# Patient Record
Sex: FEMALE | Race: ASIAN | Employment: OTHER | URBAN - METROPOLITAN AREA
[De-identification: names, ages, dates, MRNs, and addresses within clinical notes are randomized per-mention and may not be internally consistent; named-entity substitution may affect disease eponyms.]

---

## 2017-09-15 ENCOUNTER — HOSPITAL ENCOUNTER (OUTPATIENT)
Dept: OTHER | Age: 67
Discharge: HOME | End: 2017-09-15
Attending: FAMILY MEDICINE | Admitting: FAMILY MEDICINE

## 2017-09-15 DIAGNOSIS — R05.9 COUGH: ICD-10-CM

## 2019-07-31 PROBLEM — N18.4 CKD (CHRONIC KIDNEY DISEASE) STAGE 4, GFR 15-29 ML/MIN (HCC): Status: ACTIVE | Noted: 2019-07-31

## 2019-07-31 PROBLEM — E55.9 VITAMIN D DEFICIENCY: Status: ACTIVE | Noted: 2019-07-31

## 2019-10-15 PROBLEM — R80.0 ISOLATED PROTEINURIA: Status: ACTIVE | Noted: 2019-10-15

## 2020-06-14 NOTE — PROGRESS NOTES
Medical Oncology:  Radiation:  Other: Dr. Florin Hameed (nephrology)        [id number]:  IA right breast cancer      HPI:  Ms. Nati Huntley is a 68 y.o. woman who presents today with a new diagnosis of grade 2, right sided invasive lobular breast cancer. ER + MO- HER2 negative. She states that she does perform routine self breast evaluations and has not noticed any new abnormalities such as masses, skin changes, color changes, nipple discharge, or changes to the nipple-areolar complex. She has never had an abnormal mammogram.  It is been 2 to 3 years since her last mammogram.  Prior to this she has never had breast related problems and has never required a breast biopsy. She has no family history of breast or ovarian cancer. She is here today in initial consultation to discuss her recent breast diagnosis. She was referred by Dr. Simon Jo. INTERVAL HISTORY:    On 5/21/2020 she was recalled from screening mammogram for an architectural distortion of the right breast.  Left breast was negative. There is again demonstrated a subtle distortion in the upper outer quadrant of the right breast.  Ultrasound correlate of the 10 o'clock position in the right breast identifies an irregular hypoechoic nodule with spiculated margins measuring 6 mm in greatest diameter. BI-RADS 4. On 6/5/2020 she underwent core needle biopsy of the right breast.  Pathology identified grade 2 invasive lobular carcinoma. ER positive (>90%) MO negative HER-2 negative by IHC. Also identified was LCIS and atypical ductal hyperplasia. This will be reviewed by our in-house pathologist. [id number]          Review of Systems   Constitutional: Negative. HENT: Negative. Eyes: Negative. Respiratory: Negative. Cardiovascular: Negative. Gastrointestinal: Negative. Genitourinary: Negative. Musculoskeletal: Negative. Skin: Negative. Neurological: Negative. Psychiatric/Behavioral: Negative.     All other systems reviewed and are complete details, see the attached [employer] FISH Analysis HER2 Breast, Case #[id number], dated 2020. YL/lsr     GROSS DESCRIPTION Young Guerra   Right breast mass 10:00 position 14G core:   Received in formalin labeled Lilia Velasquez - needle biopsy right breast 14 gauge\" which consists of cylindrical orange-yellow and red-brown needle cores and needle core fragments that measure up to 1.7 cm in length and up to 2 mm in diameter. The specimen is totally embedded in cassette labeled A1. S/S: (1) TE.   DS/jla     Time specimen removed from patient:  2020 - 1:35 pm   Time specimen placed in formalin:  2020 - 1:35 pm   Cold ischemic time:  0 minutes. Formalin fixation time:  6-72 hours. DS/jla      Specimen Collected on   Tissue - Entire right breast (body structure) 2020 1:35 PM   Result Narrative   This result has an attachment that is not available. No past medical history on file.:    History of renal stones and renal disease requiring specialist care. No past surgical history on file.:        Allergies as of 2020    (No Known Allergies)   :        Social History     Tobacco Use    Smoking status: Never Smoker    Smokeless tobacco: Never Used   Substance Use Topics    Alcohol use: No    Drug use: No   :      Family History:  No additional family history of breast or ovarian cancer    Hormonal History:   a.0  m.0  Menarche at age 13. First live birth at age 19. did not breastfeed. Postmenopausal at age 37  Hysterectomy: no hysterectomy  No estrogen supplementation  OCP taken in the past but not currently    Medications:  Medication documentation has been reviewedin the electronic medical record and patient office intake form. Exam:  There were no vitals taken for this visit. Constitutional: She appears well-nourished. No apparent distress. Breast: The patient was examined in the upright and supine position.   She has a \"B-C\" cup breast. Breasts are symmetrically ptotic. Right: There were no new masses or changes in breast contour. There were no skin changes of the breast or nipple areolar complex. There was no nipple inversion or discharge. Left: There were no new masses or changes in breast contour. There were no skin changes of the breast or nipple areolar complex. There was no nipple inversion or discharge. There is no axillary lymphadenopathy palpated bilaterally. Head: Normocephalic and atraumatic. Eyes: EOM are normal. Pupilsare equal, round, and reactive to light. Neck: Neck supple. No tracheal deviation present. Cardiovascular: regular rate. Extremities appear well perfused. Pulmonary: respirations are non-labored and without audible distress  Lymphatics: no palpable axillary or cervical lymphadenopathy. Skin: No rash noted. No erythema. Neurologic: alert and oriented. Assessment/Plan:  [id number]:  IA right breast cancer  ER + GA- HER2 negative. I have reviewed the results of her most recent breast imaging and pathology with her. We discussed the utility of a breast MRI and extent of disease. The surgical rational and technical details of undergoing breast conservation therapy versus a mastectomy were reviewed. She understood that patients who undergo breast conservation therapy, systemic therapy, and radiotherapy have comparable local recurrences and overall survival to those patients undergoing a mastectomy. She understands that patients may experience an asymmetric change in breast contour with breast conservation that can be exacerbated with radiation therapy. We discussed the technique of localization. This will be done preoperatively by targeting the lesion, near which the clip was placed post biopsy. This is performed using either mammographic or ultrasound guidance. We discussed the aspects of breast conservation including the need for negative margins. We discussed the risk of up to a therapy is traditionally given to patients undergoing breast conservation therapy to reduce the risk of local recurrence. I have recommended she undergo a postoperative consultation with radiation oncology to discuss this treatment. Fertility does not apply. The role of genetic consultation was discussed. She declines at this time. Reconstructive options in patients undergoing breast conservation versus a mastectomy were reviewed. Anticipated clinic follow up and survivorship were discussed. Self breast evaluation was reviewed. We will plan a right partial mastectomy with sentinel lymph node biopsy in the upcoming few weeks. Preop MRI  Medical and renal clearance      All of the patient's questions were answered at this time however, she was encouraged to call the office with any further inquiries. Approximately 90 minutes of time were spent in this visit of which 50% or more of the time was related to coordination of care.

## 2020-06-25 ENCOUNTER — OFFICE VISIT (OUTPATIENT)
Dept: SURGERY | Age: 70
End: 2020-06-25
Payer: MEDICARE

## 2020-06-25 VITALS
SYSTOLIC BLOOD PRESSURE: 130 MMHG | DIASTOLIC BLOOD PRESSURE: 80 MMHG | BODY MASS INDEX: 30.09 KG/M2 | HEART RATE: 50 BPM | OXYGEN SATURATION: 97 % | WEIGHT: 149 LBS

## 2020-06-25 PROCEDURE — 99205 OFFICE O/P NEW HI 60 MIN: CPT | Performed by: SURGERY

## 2020-06-25 RX ORDER — SODIUM CHLORIDE 0.9 % (FLUSH) 0.9 %
10 SYRINGE (ML) INJECTION PRN
Status: CANCELLED | OUTPATIENT
Start: 2020-06-25

## 2020-06-25 RX ORDER — SODIUM CHLORIDE 0.9 % (FLUSH) 0.9 %
10 SYRINGE (ML) INJECTION EVERY 12 HOURS SCHEDULED
Status: CANCELLED | OUTPATIENT
Start: 2020-06-25

## 2020-06-25 RX ORDER — LIDOCAINE HYDROCHLORIDE 10 MG/ML
0.1 INJECTION, SOLUTION EPIDURAL; INFILTRATION; INTRACAUDAL; PERINEURAL
Status: SHIPPED | OUTPATIENT
Start: 2020-06-25 | End: 2020-06-25

## 2020-06-25 RX ORDER — SODIUM CHLORIDE, SODIUM LACTATE, POTASSIUM CHLORIDE, CALCIUM CHLORIDE 600; 310; 30; 20 MG/100ML; MG/100ML; MG/100ML; MG/100ML
INJECTION, SOLUTION INTRAVENOUS CONTINUOUS
Status: CANCELLED | OUTPATIENT
Start: 2020-06-25

## 2020-06-25 ASSESSMENT — ENCOUNTER SYMPTOMS
RESPIRATORY NEGATIVE: 1
GASTROINTESTINAL NEGATIVE: 1
EYES NEGATIVE: 1

## 2020-07-10 ENCOUNTER — HOSPITAL ENCOUNTER (OUTPATIENT)
Dept: MRI IMAGING | Age: 70
Discharge: HOME | End: 2020-07-10
Payer: MEDICARE

## 2020-07-10 ENCOUNTER — HOSPITAL ENCOUNTER (OUTPATIENT)
Age: 70
Discharge: HOME | End: 2020-07-10
Payer: MEDICARE

## 2020-07-10 LAB
ALBUMIN SERPL-MCNC: 4 G/DL (ref 3.4–5)
ANION GAP SERPL CALCULATED.3IONS-SCNC: 9 MMOL/L (ref 3–16)
BUN BLDV-MCNC: 36 MG/DL (ref 7–20)
CALCIUM SERPL-MCNC: 9.5 MG/DL (ref 8.3–10.6)
CHLORIDE BLD-SCNC: 106 MMOL/L (ref 99–110)
CO2: 27 MMOL/L (ref 21–32)
CREAT SERPL-MCNC: 2.3 MG/DL (ref 0.6–1.2)
GFR AFRICAN AMERICAN: 25
GFR NON-AFRICAN AMERICAN: 21
GLUCOSE BLD-MCNC: 107 MG/DL (ref 70–99)
HCT VFR BLD CALC: 36.4 % (ref 36–48)
HEMOGLOBIN: 11.9 G/DL (ref 12–16)
MCH RBC QN AUTO: 28.3 PG (ref 26–34)
MCHC RBC AUTO-ENTMCNC: 32.8 G/DL (ref 31–36)
MCV RBC AUTO: 86.2 FL (ref 80–100)
PDW BLD-RTO: 13.8 % (ref 12.4–15.4)
PHOSPHORUS: 3.8 MG/DL (ref 2.5–4.9)
PLATELET # BLD: 151 K/UL (ref 135–450)
PMV BLD AUTO: 8.3 FL (ref 5–10.5)
POTASSIUM SERPL-SCNC: 4.2 MMOL/L (ref 3.5–5.1)
RBC # BLD: 4.22 M/UL (ref 4–5.2)
SODIUM BLD-SCNC: 142 MMOL/L (ref 136–145)
WBC # BLD: 6.1 K/UL (ref 4–11)

## 2020-07-10 PROCEDURE — 80069 RENAL FUNCTION PANEL: CPT

## 2020-07-10 PROCEDURE — 85027 COMPLETE CBC AUTOMATED: CPT

## 2020-07-10 PROCEDURE — 36415 COLL VENOUS BLD VENIPUNCTURE: CPT

## 2020-07-10 NOTE — PROGRESS NOTES
MRI BREAST WITH  AND WITHOUT  CONTRAST NOT PERFORMED DUE TO PTS GFR ON 7/10/20 25 AND UNABLE TO GIVE CONTRAST UNLESS 30. PT HAS HX OF STAGE 4 KIDNEY DISEASE ALSO.  INFORMED DR SOTO'S OFFICE OF THIS ON 7/10/20 AT 1300 HR AND EXPLAINED THIS TO PATIENT ALSO

## 2020-07-31 ENCOUNTER — TELEPHONE (OUTPATIENT)
Dept: SURGERY | Age: 70
End: 2020-07-31

## 2020-07-31 NOTE — TELEPHONE ENCOUNTER
Message left on Ravinder [place] voice mail, to have Celeste Ang return call regarding her surgery. I need to make sure she has been cleared by her Nephrologist nad has had her  Pre-op. Third time I have called [phone].

## 2020-08-03 ENCOUNTER — TELEPHONE (OUTPATIENT)
Dept: SURGERY | Age: 70
End: 2020-08-03

## 2020-08-03 NOTE — TELEPHONE ENCOUNTER
Tracy Contreras from [facility] office called because the patient has surgery on 8/18/20. They just wanted to know if the patient needs any blood work done before surgery. Please call Tracy Contreras back at [phone]. Thank you.

## 2020-08-07 ENCOUNTER — TELEPHONE (OUTPATIENT)
Dept: SURGERY | Age: 70
End: 2020-08-07

## 2020-08-07 NOTE — TELEPHONE ENCOUNTER
VM left asking patient to please return call to office. Patient has to have clearance from her nephrologist before surgery can be done. Patient has been called multiple times, messages have been left on emergency contact's VM as well. No response.

## 2020-08-13 ENCOUNTER — OFFICE VISIT (OUTPATIENT)
Dept: PRIMARY CARE CLINIC | Age: 70
End: 2020-08-13
Payer: MEDICARE

## 2020-08-13 PROCEDURE — G8417 CALC BMI ABV UP PARAM F/U: HCPCS | Performed by: NURSE PRACTITIONER

## 2020-08-13 PROCEDURE — 99211 OFF/OP EST MAY X REQ PHY/QHP: CPT | Performed by: NURSE PRACTITIONER

## 2020-08-13 PROCEDURE — G8428 CUR MEDS NOT DOCUMENT: HCPCS | Performed by: NURSE PRACTITIONER

## 2020-08-13 RX ORDER — ALBUTEROL SULFATE 90 UG/1
2 AEROSOL, METERED RESPIRATORY (INHALATION) EVERY 6 HOURS PRN
COMMUNITY
End: 2022-01-06 | Stop reason: SDUPTHER

## 2020-08-13 RX ORDER — CIPROFLOXACIN 250 MG/1
250 TABLET, FILM COATED ORAL 2 TIMES DAILY
COMMUNITY
Start: 2020-08-12 | End: 2020-09-04 | Stop reason: ALTCHOICE

## 2020-08-13 NOTE — PROGRESS NOTES
Name_______________________________________Printed:____________________  Date and time of [id number]_______________________Pcblgzw Time:_0615 [facility]_______________   1. The instructions given regarding when and if a patient needs to stop oral intake prior to surgery varies. Follow the specific instructions you were given                  _X__Nothing to eat or to drink after Midnight the night before.                             ____Endoscopy patient follow your DRS instructions-generally you will be doing a part of the prep after Midnight                   ____Carbo loading or ERAS instructions will be given to select patients-if you have been given those instructions -please do the following                           The evening before your surgery after dinner before midnight drink 40 ounces of gatorade. If you are diabetic use sugar free. The morning of surgery drink 40 ounces of water. This needs to be finished 3 hours prior to your surgery start time. 2. Take the following pills with a small sip of water on the morning of surgery____NONE_______________________________________________                  Do not take blood pressure medications ending in pril or sartan the kena prior to surgery or the morning of surgery_   3. Aspirin, Ibuprofen, Advil, Naproxen, Vitamin E and other Anti-inflammatory products and supplements should be stopped for  -7days before surgery or as directed by your physician. 4. Check with your Doctor regarding stopping Plavix, Coumadin,Eliquis, Lovenox,Effient,Pradaxa,Xarelto, Fragmin or other blood thinners and follow their instructions. 5. Do not smoke, and do not drink any alcoholic beverages 24 hours prior to surgery. This includes NA Beer. Refrain from the usage of any recreational drugs. 6. You may brush your teeth and gargle the morning of surgery. DO NOT SWALLOW WATER   7.  You MUST make arrangements for a responsible adult to stay on site while you are here and take you home after your surgery. You will not be allowed to leave alone or drive yourself home. It is strongly suggested someone stay with you the first 24 hrs. Your surgery will be cancelled if you do not have a ride home. 8. A parent/legal guardian must accompany a child scheduled for surgery and plan to stay at the hospital until the child is discharged. Please do not bring other children with you. 9. Please wear simple, loose fitting clothing to the hospital.  [facility] not bring valuables (money, credit cards, checkbooks, etc.) Do not wear any makeup (including no eye makeup) or nail polish on your fingers or toes. 10. DO NOT wear any jewelry or piercings on day of surgery. All body piercing jewelry must be removed. 11. If you have ___dentures, they will be removed before going to the OR; we will provide you a container. If you wear ___contact lenses or ___glasses, they will be removed; please bring a case for them. 12. Please see your family doctor/pediatrician for a history & physical and/or concerning medications. Bring any test results/reports from your physician's office. PCP__________________Phone___________H&P Appt. Date________             13 If you  have a Living Will and Durable Power of  for Healthcare, please bring in a copy. 15. Notify your Surgeon if you develop any illness between now and surgery  time, cough, cold, fever, sore throat, nausea, vomiting, etc.  Please notify your surgeon if you experience dizziness, shortness of breath or blurred vision between now & the time of your surgery             15. DO NOT shave your operative site 96 hours prior to surgery. For face & neck surgery, men may use an electric razor 48 hours prior to surgery. 16. Shower the night before or morning of surgery using an antibacterial soap or as you have been instructed.              17. To provide excellent care visitors will be limited to one in the room at any given time. 18.  Please bring picture ID and insurance card. 19.  Visit our web site for additional information:  [email or web address]              20.During flu season no children under the age of 15 are permitted in the hospital for the safety of all patients. 21. If you take a long acting insulin in the evening only  take half of your usual  dose the night  before your procedure              22. If you use a c-pap please bring DOS if staying overnight,             23.For your convenience [id number] [facility] has a pharmacy on site to fill your prescriptions. 24. If you use oxygen and have a portable tank please bring it  with you the DOS             25. Bring a complete list of all your medications with name and dose include any supplements. 26. Other_Patient instructed to get their COVID-19 test done as directed by their doctor (5-7 days prior to procedure)  or patient states will get on __________. Patient was notified that they need to have an appointment,number to call provided. The day the COVID test is done is considered day one. Instructed to self quarantine after test until DOS. There is a one visitor policy at [facility] for all surgeries and endoscopies. Whether the visitor can stay or will be asked to wait in the car will depend on the current policy and if social distancing can be maintained. The policy is subject to change at any time. Please make sure the visitor has a cell phone that is on,charged and able to accept calls, as this may be the way that the staff communicates with them. Pain management is NO VISITOR policyThe patients ride is expected to remain in the car with a cell phone for communication. If the ride is leaving the hospital grounds please make sure they are back in time for pickup. Have the patient inform the staff on arrival what their rides plans are while the patient is in the facility. At the [facility] there is one visitor allowed. Please note that the visitor policy is subject to change._________________________________________   *Please call pre admission testing if you any further questions   Maggie Velezet [address] [place] [address]  [phone]   [address] [place] [address]       All above information reviewed with patient in person or by phone. Patient verbalizes understanding. All questions and concerns addressed.                                                                                                  Patient/Rep__PT__________________                                                                                                                                    PRE OP INSTRUCTIONS

## 2020-08-13 NOTE — PATIENT INSTRUCTIONS

## 2020-08-14 LAB — SARS-COV-2, NAA: NOT DETECTED

## 2020-08-18 ENCOUNTER — ANESTHESIA EVENT (OUTPATIENT)
Dept: OPERATING ROOM | Age: 70
End: 2020-08-18
Payer: MEDICARE

## 2020-08-18 ENCOUNTER — APPOINTMENT (OUTPATIENT)
Dept: WOMENS IMAGING | Age: 70
End: 2020-08-18
Attending: SURGERY
Payer: MEDICARE

## 2020-08-18 ENCOUNTER — HOSPITAL ENCOUNTER (OUTPATIENT)
Dept: NUCLEAR MEDICINE | Age: 70
Discharge: HOME | End: 2020-08-18
Payer: MEDICARE

## 2020-08-18 ENCOUNTER — ANESTHESIA (OUTPATIENT)
Dept: OPERATING ROOM | Age: 70
End: 2020-08-18
Payer: MEDICARE

## 2020-08-18 ENCOUNTER — HOSPITAL ENCOUNTER (OUTPATIENT)
Age: 70
Setting detail: OUTPATIENT SURGERY
Discharge: HOME | End: 2020-08-18
Attending: SURGERY | Admitting: SURGERY
Payer: MEDICARE

## 2020-08-18 ENCOUNTER — HOSPITAL ENCOUNTER (OUTPATIENT)
Dept: WOMENS IMAGING | Age: 70
Setting detail: OUTPATIENT SURGERY
Discharge: HOME | End: 2020-08-18
Attending: SURGERY
Payer: MEDICARE

## 2020-08-18 ENCOUNTER — HOSPITAL ENCOUNTER (OUTPATIENT)
Dept: WOMENS IMAGING | Age: 70
Setting detail: OUTPATIENT SURGERY
End: 2020-08-18
Attending: SURGERY
Payer: MEDICARE

## 2020-08-18 VITALS
TEMPERATURE: 97.9 F | WEIGHT: 148 LBS | RESPIRATION RATE: 16 BRPM | BODY MASS INDEX: 29.84 KG/M2 | SYSTOLIC BLOOD PRESSURE: 136 MMHG | HEART RATE: 43 BPM | OXYGEN SATURATION: 98 % | DIASTOLIC BLOOD PRESSURE: 54 MMHG | HEIGHT: 59 IN

## 2020-08-18 VITALS
OXYGEN SATURATION: 100 % | SYSTOLIC BLOOD PRESSURE: 111 MMHG | DIASTOLIC BLOOD PRESSURE: 55 MMHG | RESPIRATION RATE: 12 BRPM

## 2020-08-18 LAB
ABO/RH: NORMAL
ANION GAP SERPL CALCULATED.3IONS-SCNC: 11 MMOL/L (ref 3–16)
ANTIBODY SCREEN: NORMAL
BUN BLDV-MCNC: 40 MG/DL (ref 7–20)
CALCIUM SERPL-MCNC: 9.3 MG/DL (ref 8.3–10.6)
CHLORIDE BLD-SCNC: 105 MMOL/L (ref 99–110)
CO2: 26 MMOL/L (ref 21–32)
CREAT SERPL-MCNC: 2.7 MG/DL (ref 0.6–1.2)
GFR AFRICAN AMERICAN: 21
GFR NON-AFRICAN AMERICAN: 17
GLUCOSE BLD-MCNC: 100 MG/DL (ref 70–99)
POTASSIUM SERPL-SCNC: 3.9 MMOL/L (ref 3.5–5.1)
SODIUM BLD-SCNC: 142 MMOL/L (ref 136–145)

## 2020-08-18 PROCEDURE — 38525 BIOPSY/REMOVAL LYMPH NODES: CPT | Performed by: SURGERY

## 2020-08-18 PROCEDURE — 14301 TIS TRNFR ANY 30.1-60 SQ CM: CPT | Performed by: SURGERY

## 2020-08-18 PROCEDURE — 6370000000 HC RX 637 (ALT 250 FOR IP): Performed by: SURGERY

## 2020-08-18 PROCEDURE — 2500000003 HC RX 250 WO HCPCS: Performed by: SURGERY

## 2020-08-18 PROCEDURE — 88305 TISSUE EXAM BY PATHOLOGIST: CPT

## 2020-08-18 PROCEDURE — 86900 BLOOD TYPING SEROLOGIC ABO: CPT

## 2020-08-18 PROCEDURE — 7100000011 HC PHASE II RECOVERY - ADDTL 15 MIN: Performed by: SURGERY

## 2020-08-18 PROCEDURE — 3600000014 HC SURGERY LEVEL 4 ADDTL 15MIN: Performed by: SURGERY

## 2020-08-18 PROCEDURE — 7100000001 HC PACU RECOVERY - ADDTL 15 MIN: Performed by: SURGERY

## 2020-08-18 PROCEDURE — 86850 RBC ANTIBODY SCREEN: CPT

## 2020-08-18 PROCEDURE — 2500000003 HC RX 250 WO HCPCS: Performed by: NURSE ANESTHETIST, CERTIFIED REGISTERED

## 2020-08-18 PROCEDURE — 2580000003 HC RX 258: Performed by: SURGERY

## 2020-08-18 PROCEDURE — 3430000000 HC RX DIAGNOSTIC RADIOPHARMACEUTICAL: Performed by: SURGERY

## 2020-08-18 PROCEDURE — 76098 X-RAY EXAM SURGICAL SPECIMEN: CPT

## 2020-08-18 PROCEDURE — 19294 PREPJ TUM CAV IORT PRTL MAST: CPT | Performed by: SURGERY

## 2020-08-18 PROCEDURE — 86901 BLOOD TYPING SEROLOGIC RH(D): CPT

## 2020-08-18 PROCEDURE — 88342 IMHCHEM/IMCYTCHM 1ST ANTB: CPT

## 2020-08-18 PROCEDURE — 38792 RA TRACER ID OF SENTINL NODE: CPT | Performed by: SURGERY

## 2020-08-18 PROCEDURE — 88307 TISSUE EXAM BY PATHOLOGIST: CPT

## 2020-08-18 PROCEDURE — 7100000000 HC PACU RECOVERY - FIRST 15 MIN: Performed by: SURGERY

## 2020-08-18 PROCEDURE — 2709999900 HC NON-CHARGEABLE SUPPLY: Performed by: SURGERY

## 2020-08-18 PROCEDURE — 80048 BASIC METABOLIC PNL TOTAL CA: CPT

## 2020-08-18 PROCEDURE — 88360 TUMOR IMMUNOHISTOCHEM/MANUAL: CPT

## 2020-08-18 PROCEDURE — A4648 IMPLANTABLE TISSUE MARKER: HCPCS

## 2020-08-18 PROCEDURE — 3600000004 HC SURGERY LEVEL 4 BASE: Performed by: SURGERY

## 2020-08-18 PROCEDURE — 6360000002 HC RX W HCPCS: Performed by: SURGERY

## 2020-08-18 PROCEDURE — A4648 IMPLANTABLE TISSUE MARKER: HCPCS | Performed by: SURGERY

## 2020-08-18 PROCEDURE — A9520 TC99 TILMANOCEPT DIAG 0.5MCI: HCPCS | Performed by: SURGERY

## 2020-08-18 PROCEDURE — 6360000002 HC RX W HCPCS: Performed by: NURSE ANESTHETIST, CERTIFIED REGISTERED

## 2020-08-18 PROCEDURE — 2720000010 HC SURG SUPPLY STERILE: Performed by: SURGERY

## 2020-08-18 PROCEDURE — 19301 PARTIAL MASTECTOMY: CPT | Performed by: SURGERY

## 2020-08-18 PROCEDURE — 38900 IO MAP OF SENT LYMPH NODE: CPT | Performed by: SURGERY

## 2020-08-18 PROCEDURE — 3700000000 HC ANESTHESIA ATTENDED CARE: Performed by: SURGERY

## 2020-08-18 PROCEDURE — 7100000010 HC PHASE II RECOVERY - FIRST 15 MIN: Performed by: SURGERY

## 2020-08-18 PROCEDURE — 38792 RA TRACER ID OF SENTINL NODE: CPT

## 2020-08-18 PROCEDURE — 3700000001 HC ADD 15 MINUTES (ANESTHESIA): Performed by: SURGERY

## 2020-08-18 DEVICE — Z INACTIVE USE 2535481 MARKER SURG W2XH1XL2CM BIOZORB LO PROF: Type: IMPLANTABLE DEVICE | Site: BREAST | Status: FUNCTIONAL

## 2020-08-18 RX ORDER — PHENYLEPHRINE HCL IN 0.9% NACL 1 MG/10 ML
SYRINGE (ML) INTRAVENOUS PRN
Status: DISCONTINUED | OUTPATIENT
Start: 2020-08-18 | End: 2020-08-18 | Stop reason: SDUPTHER

## 2020-08-18 RX ORDER — CLINDAMYCIN PHOSPHATE 900 MG/50ML
900 INJECTION INTRAVENOUS
Status: DISCONTINUED | OUTPATIENT
Start: 2020-08-18 | End: 2020-08-18 | Stop reason: SDUPTHER

## 2020-08-18 RX ORDER — SODIUM CHLORIDE 0.9 % (FLUSH) 0.9 %
10 SYRINGE (ML) INJECTION EVERY 12 HOURS SCHEDULED
Status: DISCONTINUED | OUTPATIENT
Start: 2020-08-18 | End: 2020-08-18 | Stop reason: HOSPADM

## 2020-08-18 RX ORDER — HYDROCODONE BITARTRATE AND ACETAMINOPHEN 5; 325 MG/1; MG/1
1 TABLET ORAL
Status: DISCONTINUED | OUTPATIENT
Start: 2020-08-18 | End: 2020-08-18 | Stop reason: HOSPADM

## 2020-08-18 RX ORDER — ISOSULFAN BLUE 50 MG/5ML
INJECTION, SOLUTION SUBCUTANEOUS
Status: COMPLETED | OUTPATIENT
Start: 2020-08-18 | End: 2020-08-18

## 2020-08-18 RX ORDER — SUCCINYLCHOLINE/SOD CL,ISO/PF 200MG/10ML
SYRINGE (ML) INTRAVENOUS PRN
Status: DISCONTINUED | OUTPATIENT
Start: 2020-08-18 | End: 2020-08-18 | Stop reason: SDUPTHER

## 2020-08-18 RX ORDER — PROPOFOL 10 MG/ML
INJECTION, EMULSION INTRAVENOUS PRN
Status: DISCONTINUED | OUTPATIENT
Start: 2020-08-18 | End: 2020-08-18 | Stop reason: SDUPTHER

## 2020-08-18 RX ORDER — HYDROMORPHONE HCL 110MG/55ML
0.5 PATIENT CONTROLLED ANALGESIA SYRINGE INTRAVENOUS EVERY 5 MIN PRN
Status: DISCONTINUED | OUTPATIENT
Start: 2020-08-18 | End: 2020-08-18 | Stop reason: HOSPADM

## 2020-08-18 RX ORDER — BUPIVACAINE HYDROCHLORIDE AND EPINEPHRINE 2.5; 5 MG/ML; UG/ML
INJECTION, SOLUTION EPIDURAL; INFILTRATION; INTRACAUDAL; PERINEURAL
Status: COMPLETED | OUTPATIENT
Start: 2020-08-18 | End: 2020-08-18

## 2020-08-18 RX ORDER — SODIUM CHLORIDE, SODIUM LACTATE, POTASSIUM CHLORIDE, CALCIUM CHLORIDE 600; 310; 30; 20 MG/100ML; MG/100ML; MG/100ML; MG/100ML
INJECTION, SOLUTION INTRAVENOUS CONTINUOUS
Status: DISCONTINUED | OUTPATIENT
Start: 2020-08-18 | End: 2020-08-18 | Stop reason: HOSPADM

## 2020-08-18 RX ORDER — GLYCOPYRROLATE 1 MG/5 ML
SYRINGE (ML) INTRAVENOUS PRN
Status: DISCONTINUED | OUTPATIENT
Start: 2020-08-18 | End: 2020-08-18 | Stop reason: SDUPTHER

## 2020-08-18 RX ORDER — LIDOCAINE HYDROCHLORIDE 20 MG/ML
INJECTION, SOLUTION EPIDURAL; INFILTRATION; INTRACAUDAL; PERINEURAL PRN
Status: DISCONTINUED | OUTPATIENT
Start: 2020-08-18 | End: 2020-08-18 | Stop reason: SDUPTHER

## 2020-08-18 RX ORDER — ONDANSETRON 2 MG/ML
INJECTION INTRAMUSCULAR; INTRAVENOUS PRN
Status: DISCONTINUED | OUTPATIENT
Start: 2020-08-18 | End: 2020-08-18 | Stop reason: SDUPTHER

## 2020-08-18 RX ORDER — HYDROCODONE BITARTRATE AND ACETAMINOPHEN 5; 325 MG/1; MG/1
1 TABLET ORAL EVERY 4 HOURS PRN
Qty: 42 TABLET | Refills: 0 | Status: SHIPPED | OUTPATIENT
Start: 2020-08-18 | End: 2020-08-25

## 2020-08-18 RX ORDER — HYDROMORPHONE HCL 110MG/55ML
0.25 PATIENT CONTROLLED ANALGESIA SYRINGE INTRAVENOUS EVERY 5 MIN PRN
Status: DISCONTINUED | OUTPATIENT
Start: 2020-08-18 | End: 2020-08-18 | Stop reason: HOSPADM

## 2020-08-18 RX ORDER — FENTANYL CITRATE 50 UG/ML
INJECTION, SOLUTION INTRAMUSCULAR; INTRAVENOUS PRN
Status: DISCONTINUED | OUTPATIENT
Start: 2020-08-18 | End: 2020-08-18 | Stop reason: SDUPTHER

## 2020-08-18 RX ORDER — CEPHALEXIN 500 MG/1
500 CAPSULE ORAL 4 TIMES DAILY
Qty: 28 CAPSULE | Refills: 0 | Status: SHIPPED | OUTPATIENT
Start: 2020-08-18 | End: 2020-08-25

## 2020-08-18 RX ORDER — LIDOCAINE HYDROCHLORIDE 10 MG/ML
1 INJECTION, SOLUTION EPIDURAL; INFILTRATION; INTRACAUDAL; PERINEURAL
Status: DISCONTINUED | OUTPATIENT
Start: 2020-08-18 | End: 2020-08-18 | Stop reason: HOSPADM

## 2020-08-18 RX ORDER — DEXAMETHASONE SODIUM PHOSPHATE 4 MG/ML
INJECTION, SOLUTION INTRA-ARTICULAR; INTRALESIONAL; INTRAMUSCULAR; INTRAVENOUS; SOFT TISSUE PRN
Status: DISCONTINUED | OUTPATIENT
Start: 2020-08-18 | End: 2020-08-18 | Stop reason: SDUPTHER

## 2020-08-18 RX ORDER — SODIUM CHLORIDE 0.9 % (FLUSH) 0.9 %
10 SYRINGE (ML) INJECTION PRN
Status: DISCONTINUED | OUTPATIENT
Start: 2020-08-18 | End: 2020-08-18 | Stop reason: HOSPADM

## 2020-08-18 RX ORDER — SODIUM CHLORIDE 9 MG/ML
INJECTION, SOLUTION INTRAVENOUS CONTINUOUS
Status: DISCONTINUED | OUTPATIENT
Start: 2020-08-18 | End: 2020-08-18 | Stop reason: HOSPADM

## 2020-08-18 RX ORDER — ONDANSETRON 2 MG/ML
4 INJECTION INTRAMUSCULAR; INTRAVENOUS
Status: DISCONTINUED | OUTPATIENT
Start: 2020-08-18 | End: 2020-08-18 | Stop reason: HOSPADM

## 2020-08-18 RX ADMIN — Medication 200 MCG: at 09:56

## 2020-08-18 RX ADMIN — ONDANSETRON 4 MG: 2 INJECTION INTRAMUSCULAR; INTRAVENOUS at 09:48

## 2020-08-18 RX ADMIN — CEFAZOLIN 2 G: 10 INJECTION, POWDER, FOR SOLUTION INTRAVENOUS at 09:28

## 2020-08-18 RX ADMIN — FENTANYL CITRATE 50 MCG: 50 INJECTION, SOLUTION INTRAMUSCULAR; INTRAVENOUS at 10:33

## 2020-08-18 RX ADMIN — TILMANOCEPT 0.5 MILLICURIE: KIT at 09:57

## 2020-08-18 RX ADMIN — Medication 0.3 MG: at 09:55

## 2020-08-18 RX ADMIN — PROPOFOL 120 MG: 10 INJECTION, EMULSION INTRAVENOUS at 09:40

## 2020-08-18 RX ADMIN — SODIUM CHLORIDE, POTASSIUM CHLORIDE, SODIUM LACTATE AND CALCIUM CHLORIDE: 600; 310; 30; 20 INJECTION, SOLUTION INTRAVENOUS at 08:42

## 2020-08-18 RX ADMIN — DEXAMETHASONE SODIUM PHOSPHATE 8 MG: 4 INJECTION, SOLUTION INTRAMUSCULAR; INTRAVENOUS at 09:48

## 2020-08-18 RX ADMIN — Medication 140 MG: at 09:40

## 2020-08-18 RX ADMIN — FENTANYL CITRATE 50 MCG: 50 INJECTION, SOLUTION INTRAMUSCULAR; INTRAVENOUS at 09:38

## 2020-08-18 RX ADMIN — LIDOCAINE HYDROCHLORIDE 60 MG: 20 INJECTION, SOLUTION EPIDURAL; INFILTRATION; INTRACAUDAL; PERINEURAL at 09:40

## 2020-08-18 ASSESSMENT — PULMONARY FUNCTION TESTS
PIF_VALUE: 21
PIF_VALUE: 20
PIF_VALUE: 21
PIF_VALUE: 21
PIF_VALUE: 1
PIF_VALUE: 21
PIF_VALUE: 22
PIF_VALUE: 18
PIF_VALUE: 21
PIF_VALUE: 1
PIF_VALUE: 22
PIF_VALUE: 26
PIF_VALUE: 22
PIF_VALUE: 19
PIF_VALUE: 0
PIF_VALUE: 21
PIF_VALUE: 22
PIF_VALUE: 21
PIF_VALUE: 22
PIF_VALUE: 15
PIF_VALUE: 21
PIF_VALUE: 21
PIF_VALUE: 24
PIF_VALUE: 21
PIF_VALUE: 21
PIF_VALUE: 8
PIF_VALUE: 21
PIF_VALUE: 21
PIF_VALUE: 22
PIF_VALUE: 20
PIF_VALUE: 2
PIF_VALUE: 21
PIF_VALUE: 21
PIF_VALUE: 24
PIF_VALUE: 21
PIF_VALUE: 22
PIF_VALUE: 21
PIF_VALUE: 21
PIF_VALUE: 22
PIF_VALUE: 20
PIF_VALUE: 18
PIF_VALUE: 18
PIF_VALUE: 22
PIF_VALUE: 21
PIF_VALUE: 21
PIF_VALUE: 22
PIF_VALUE: 21
PIF_VALUE: 24
PIF_VALUE: 21
PIF_VALUE: 21
PIF_VALUE: 22
PIF_VALUE: 21
PIF_VALUE: 21
PIF_VALUE: 17
PIF_VALUE: 20
PIF_VALUE: 24
PIF_VALUE: 18
PIF_VALUE: 20
PIF_VALUE: 21
PIF_VALUE: 0
PIF_VALUE: 21
PIF_VALUE: 21
PIF_VALUE: 0
PIF_VALUE: 0
PIF_VALUE: 20
PIF_VALUE: 21
PIF_VALUE: 20
PIF_VALUE: 21
PIF_VALUE: 30
PIF_VALUE: 23
PIF_VALUE: 21
PIF_VALUE: 21
PIF_VALUE: 22
PIF_VALUE: 21
PIF_VALUE: 21
PIF_VALUE: 20
PIF_VALUE: 35
PIF_VALUE: 21
PIF_VALUE: 18
PIF_VALUE: 21
PIF_VALUE: 21
PIF_VALUE: 20
PIF_VALUE: 24
PIF_VALUE: 21
PIF_VALUE: 22
PIF_VALUE: 2
PIF_VALUE: 22
PIF_VALUE: 21
PIF_VALUE: 18
PIF_VALUE: 0
PIF_VALUE: 16
PIF_VALUE: 21
PIF_VALUE: 22
PIF_VALUE: 21
PIF_VALUE: 19

## 2020-08-18 ASSESSMENT — PAIN - FUNCTIONAL ASSESSMENT: PAIN_FUNCTIONAL_ASSESSMENT: 0-10

## 2020-08-18 NOTE — OP NOTE
Operative Note    Postoperative Note    Fauzia Fagan  YOB: 1950  [id number]    Pre-operative Diagnosis: T 1b N0 right breast cancer    Post-operative Diagnosis: Same    Procedure: Injection of isosulfuran blue dye for lymphatic mapping, injection of radiopharmaceutical for lymphatic mapping, lymphatic mapping with neoprobe unit, right deep axillary selective lymph node dissection, right needle directed partial mastectomy, right tissue rearrangement procedure for area of 30 sq. Cm. Immediate insertion of a breast prosthesis: BioZorb    Anesthesia: General    Surgeons/Assistants: Moira Browne Specking: Lauro Browning    Estimated Blood Loss: less than 50     Drains: none    Complications: None apparent at conclusion of procedure    Specimens: right breast tissue, sentinel nodes (see below for details)    Findings: sentinel nodes, specimen radiograph shows capture of lesion in question    Post-Op Condition: Stable    Disposition: to recovery room    Description of Procedure:   Ms. Lauren Kurtz is a 68 y.o. woman with a clinical T1b N0 right breast cancer. She has elected to proceed with right breast conservation therapy and selective lymph node dissection. The indications for the planned procedure, along with the potential benefits and risks which include but are not limited to the risk of anesthesia, bleeding, infection, possible failed operation, possible need for additional surgery pending final pathologic assessment, lymphedema, sensation changes, and unappealing cosmetics were reviewed. All questions were answered and she agrees to proceed. Ms. Lauren Kurtz was met by me in the preoperative area. The surgical site was identified. Consent was obtained. The appropriate breast imaging was reviewed. She underwent an image guided localization procedure preoperatively which was performed by the on site radiologist. The right breast lesion was again noted with the biopsy clip.  The localizer is in good position. She was brought to the operating room and placed supine with her arms extended on boards. She was appropriately positioned and padded. Compression stockings were placed. Appropriate antibiotics were administered within 60 minutes of the incision. Breast imaging was available in the room. After induction of general anesthesia, the appropriate World Health Organization timeout procedure was performed. At 1123 0.5 millicuries of Lymphoseek technetium-99 sulfur colloid was injected intradermally in a periareolar location at 12:00, 3:00, 6:00, and 9:00. A neoprobe was then used to identify a hot spot. The location was marked. This initial count was 565. After this 5 mL of  isosulfuran blue dye for lymphatic mapping was injected intraparenchymally at 0945. A five minute massage was performed. The right breast and axillary region, upper arm, and chest wall were prepped and draped in the normal sterile fashion. Attention was then turned to the breast for the partial mastectomy. There was one localizer placed by the mammographer marking the right breast cancer. The skin and soft tissues over the proposed incision were infiltrated with local. A periareolar incision was made at the 9-12:00 location. Flaps were raised and dissection was carried out in a rectangular fashion around the localizer and target. No skin was removed. Dissection was carried to the chest wall. This included a deep margin with a stitch on the new margin. Dissection was carried out carefully to try and maintain the localizer centrally within the specimen. The specimen was excised and gross examination revealed adequate margins. The specimen was oriented with a margin map. It was submitted for specimen radiography which revealed the lesion in question with adequate radiographic margins. The wound was irrigated and hemostasis was obtained.  In order to obtain the best cosmesis while closing the surgical cavity, I performed a radioactive and palpably suspicious nodes were removed. The axilla was then assessed for hemostasis. Cautery, surgical clips, and hemostatic agents were utilized as necessary. The wound was irrigated. The subcutaneous tissues were approximated with interrupted 2-0 and 3-0 vicryl sutures and the overlying skin with a 4-0 subcuticular stitch with overlying skin glue. The instrument, sponge, and needle counts were correct. Ms. Lauren Kurtz was awakened and placed into a surgical bra. She was taken to the recovery room in stable condition. Her family was notified of intraoperative findings.       Electronically signed by Sudha Macias MD on 8/18/20 at 8:07 AM EDT

## 2020-08-18 NOTE — H&P
H&P Update    The patient's most recent H&P, office notes, breast imaging, and pathology were reviewed. Patient examined and laterality marked. There has been no changes. We will plan to proceed with a right partial mastectomy with sentinel lymph node biopsy. The patient was counseled at length about the risks of mallory Covid-19 during their perioperative period and any recovery window from their procedure. The patient was made aware that mallory Covid-19  may worsen their prognosis for recovering from their procedure  and lend to a higher morbidity and/or mortality risk. All material risks, benefits, and reasonable alternatives including postponing the procedure were discussed. The patient does wish to proceed with the procedure at this time.           Benjamin Barajas

## 2020-08-18 NOTE — ANESTHESIA POSTPROCEDURE EVALUATION
Department of Anesthesiology  Postprocedure Note    Patient: Julieta Mckeon  MRN: [id number]  YOB: 1950  Date of evaluation: 8/18/2020  Time:  12:30 PM     Procedure Summary     Date:  08/18/20 Room / Location:  [address]    Anesthesia Start:  0930 Anesthesia Stop:  4746    Procedure:  RIGHT BREAST LOCALIZIED PARTIAL MASTECTOMY; SENTINEL LYMPH NODE BIOPSY, BIOZORB, TECHNETIUM NINETY-NINE AND BLUE DYE IN O.R. (Right Breast) Diagnosis:  (C50.911  PRIMARY BREAST MALIGNANCY, RIGHT ([address] - PRIMARY)    Surgeon:  Brant Small MD Responsible Provider:  Malou Camp MD    Anesthesia Type:  general ASA Status:  3          Anesthesia Type: general    Benito Phase I: Benito Score: 10    Benito Phase II: Benito Score: 10    Last vitals: Reviewed and per EMR flowsheets.        Anesthesia Post Evaluation    Patient location during evaluation: PACU  Patient participation: complete - patient participated  Level of consciousness: awake  Airway patency: patent  Nausea & Vomiting: no vomiting  Complications: no  Cardiovascular status: hemodynamically stable  Respiratory status: acceptable  Hydration status: euvolemic

## 2020-08-18 NOTE — ANESTHESIA PRE PROCEDURE
Department of Anesthesiology  Preprocedure Note       Name:  Radha Marsh   Age:  68 y.o.  :  1950                                          MRN:  [id number]         Date:  2020      Surgeon: Aleksandr Raines):  Jose Armando Sanchez MD    Procedure: Procedure(s):  RIGHT BREAST LOCALIZIED PARTIAL MASTECTOMY; SENTINEL LYMPH NODE BIOPSY, BIOZORB, TECHNETIUM NINETY-NINE AND BLUE DYE IN O.R.    Medications prior to admission:   Prior to Admission medications    Medication Sig Start Date End Date Taking? Authorizing Provider   HYDROcodone-acetaminophen (NORCO) 5-325 MG per tablet Take 1 tablet by mouth every 4 hours as needed for Pain for up to 7 days. Intended supply: 7 days.  Take lowest dose possible to manage pain 20 Yes Jose Armando Sanchez MD   albuterol sulfate HFA (VENTOLIN HFA) 108 (90 Base) MCG/ACT inhaler Inhale 2 puffs into the lungs every 6 hours as needed for Wheezing   Yes Historical Provider, MD   ciprofloxacin (CIPRO) 250 MG tablet Take 250 mg by mouth 2 times daily 20  Yes Historical Provider, MD   simvastatin (ZOCOR) 20 MG tablet TAKE 1 TABLET BY MOUTH AT BEDTIME 19  Yes Historical Provider, MD   losartan (COZAAR) 25 MG tablet Take 12.5 mg by mouth daily  19  Yes Historical Provider, MD   montelukast (SINGULAIR) 10 MG tablet Take 10 mg by mouth nightly   Yes Historical Provider, MD   budesonide-formoterol (SYMBICORT) 80-4.5 MCG/ACT AERO Inhale 2 puffs into the lungs daily as needed NOT TAKING 19  Historical Provider, MD       Current medications:    Current Facility-Administered Medications   Medication Dose Route Frequency Provider Last Rate Last Dose    ceFAZolin (ANCEF) 2 g in dextrose 5 % 100 mL IVPB  2 g Intravenous On Call to [facility], MD        lactated ringers infusion   Intravenous Continuous Jose Armando Sanchez MD 50 mL/hr at 20 0842      sodium chloride flush 0.9 % injection 10 mL  10 mL Intravenous 2 times per day Reny Chapa MD        sodium chloride flush 0.9 % injection 10 mL  10 mL Intravenous PRN Reny Chapa MD        HYDROcodone-acetaminophen [facility]) 5-325 MG per tablet 1 tablet  1 tablet Oral Once PRN Marty Leach MD        ondansetron [facility]) injection 4 mg  4 mg Intravenous Once PRN Marty Leach MD        HYDROmorphone (DILAUDID) injection 0.25 mg  0.25 mg Intravenous Q5 Min PRN Marty Leach MD        HYDROmorphone (DILAUDID) injection 0.5 mg  0.5 mg Intravenous Q5 Min PRN Marty Leach MD           Allergies:  No Known Allergies    Problem List:    Patient Active Problem List   Diagnosis Code    Vitamin D deficiency E55.9    CKD (chronic kidney disease) stage 4, GFR 15-29 ml/min (HCC) N18.4    Isolated proteinuria R80.0       Past Medical History:        Diagnosis Date    Asthma     CKD (chronic kidney disease) stage 4, GFR 15-29 ml/min (HCC)     Hyperlipidemia     Hypertension     Kidney stones     Memory loss     Osteopenia        Past Surgical History:        Procedure Laterality Date    COLONOSCOPY      HYSTERECTOMY      LITHOTRIPSY         Social History:    Social History     Tobacco Use    Smoking status: Never Smoker    Smokeless tobacco: Never Used   Substance Use Topics    Alcohol use:  No                                Counseling given: Not Answered      Vital Signs (Current):   Vitals:    08/13/20 1038 08/18/20 0717 08/18/20 0726   BP:   136/67   Pulse:   (!) 48   Resp:   12   Temp:   98.3 °F (36.8 °C)   TempSrc:   Temporal   SpO2:   99%   Weight: 158 lb (71.7 kg) 148 lb (67.1 kg)    Height: 4' 11\" (1.499 m) 4' 11\" (1.499 m)                                               BP Readings from Last 3 Encounters:   08/18/20 136/67   08/10/20 128/70   06/25/20 130/80       NPO Status: Time of last liquid consumption: 2300                        Time of last solid consumption: 2300                        Date of last liquid consumption: 08/17/20 Date of last solid food consumption: 08/17/20    BMI:   Wt Readings from Last 3 Encounters:   08/18/20 148 lb (67.1 kg)   08/10/20 158 lb (71.7 kg)   06/25/20 149 lb (67.6 kg)     Body mass index is 29.89 kg/m². CBC:   Lab Results   Component Value Date    WBC 6.1 07/10/2020    RBC 4.22 07/10/2020    HGB 11.9 07/10/2020    HCT 36.4 07/10/2020    MCV 86.2 07/10/2020    RDW 13.8 07/10/2020     07/10/2020       CMP:   Lab Results   Component Value Date     08/18/2020    K 3.9 08/18/2020     08/18/2020    CO2 26 08/18/2020    BUN 40 08/18/2020    CREATININE 2.7 08/18/2020    GFRAA 21 08/18/2020    LABGLOM 17 08/18/2020    GLUCOSE 100 08/18/2020    GLUCOSE 97 08/30/2019    CALCIUM 9.3 08/18/2020       POC Tests: No results for input(s): POCGLU, POCNA, POCK, POCCL, POCBUN, POCHEMO, POCHCT in the last 72 hours.     Coags: No results found for: PROTIME, INR, APTT    HCG (If Applicable): No results found for: PREGTESTUR, PREGSERUM, HCG, HCGQUANT     ABGs: No results found for: PHART, PO2ART, NMW6LIM, DAK8FMJ, BEART, G6XNPIEK     Type & Screen (If Applicable):  No results found for: LABABO, LABRH    Drug/Infectious Status (If Applicable):  No results found for: HIV, HEPCAB    COVID-19 Screening (If Applicable):   Lab Results   Component Value Date    COVID19 NOT DETECTED 08/13/2020         Anesthesia Evaluation  Patient summary reviewed no history of anesthetic complications:   Airway: Mallampati: II  TM distance: >3 FB   Neck ROM: full  Mouth opening: > = 3 FB Dental:      Comment: Missing some teeth, front, top none loose    Pulmonary: breath sounds clear to auscultation  (+) asthma (has not used albuterol since March):     (-) rhonchi and wheezes                           Cardiovascular:    (+) hypertension:, hyperlipidemia    (-) CABG/stent, dysrhythmias and  angina      Rhythm: regular  Rate: normal                    Neuro/Psych:      (-) seizures, TIA and CVA            ROS comment: Memory issues GI/Hepatic/Renal:   (+) renal disease: CRI,      (-) GERD       Endo/Other:                      ROS comment: Breast mass Abdominal:           Vascular:                                        Anesthesia Plan      general     ASA 3       Induction: intravenous. MIPS: Postoperative opioids intended, Prophylactic antiemetics administered and Postoperative trial extubation. Anesthetic plan and risks discussed with patient and spouse. Use of blood products discussed with patient whom consented to blood products. Plan discussed with CRNA.                 Lydia Sepulveda MD   8/18/2020

## 2020-08-26 ENCOUNTER — APPOINTMENT (OUTPATIENT)
Dept: CT IMAGING | Age: 70
End: 2020-08-26
Payer: MEDICARE

## 2020-08-26 ENCOUNTER — HOSPITAL ENCOUNTER (EMERGENCY)
Age: 70
Discharge: HOME | End: 2020-08-27
Attending: EMERGENCY MEDICINE
Payer: MEDICARE

## 2020-08-26 LAB
A/G RATIO: 1.2 (ref 1.1–2.2)
ALBUMIN SERPL-MCNC: 4.1 G/DL (ref 3.4–5)
ALP BLD-CCNC: 62 U/L (ref 40–129)
ALT SERPL-CCNC: 10 U/L (ref 10–40)
ANION GAP SERPL CALCULATED.3IONS-SCNC: 10 MMOL/L (ref 3–16)
AST SERPL-CCNC: 19 U/L (ref 15–37)
BASOPHILS ABSOLUTE: 0 K/UL (ref 0–0.2)
BASOPHILS RELATIVE PERCENT: 0.4 %
BILIRUB SERPL-MCNC: 0.5 MG/DL (ref 0–1)
BUN BLDV-MCNC: 31 MG/DL (ref 7–20)
CALCIUM SERPL-MCNC: 9.6 MG/DL (ref 8.3–10.6)
CHLORIDE BLD-SCNC: 97 MMOL/L (ref 99–110)
CO2: 26 MMOL/L (ref 21–32)
CREAT SERPL-MCNC: 2.1 MG/DL (ref 0.6–1.2)
EOSINOPHILS ABSOLUTE: 0.1 K/UL (ref 0–0.6)
EOSINOPHILS RELATIVE PERCENT: 1.3 %
GFR AFRICAN AMERICAN: 28
GFR NON-AFRICAN AMERICAN: 23
GLOBULIN: 3.3 G/DL
GLUCOSE BLD-MCNC: 116 MG/DL (ref 70–99)
HCT VFR BLD CALC: 38.7 % (ref 36–48)
HEMOGLOBIN: 12.8 G/DL (ref 12–16)
LACTIC ACID, SEPSIS: 1.1 MMOL/L (ref 0.4–1.9)
LIPASE: 29 U/L (ref 13–60)
LYMPHOCYTES ABSOLUTE: 0.9 K/UL (ref 1–5.1)
LYMPHOCYTES RELATIVE PERCENT: 11.1 %
MCH RBC QN AUTO: 28.3 PG (ref 26–34)
MCHC RBC AUTO-ENTMCNC: 33.2 G/DL (ref 31–36)
MCV RBC AUTO: 85.3 FL (ref 80–100)
MONOCYTES ABSOLUTE: 0.6 K/UL (ref 0–1.3)
MONOCYTES RELATIVE PERCENT: 7.4 %
NEUTROPHILS ABSOLUTE: 6.3 K/UL (ref 1.7–7.7)
NEUTROPHILS RELATIVE PERCENT: 79.8 %
PDW BLD-RTO: 13.3 % (ref 12.4–15.4)
PLATELET # BLD: 153 K/UL (ref 135–450)
PMV BLD AUTO: 8.8 FL (ref 5–10.5)
POTASSIUM SERPL-SCNC: 4 MMOL/L (ref 3.5–5.1)
RBC # BLD: 4.53 M/UL (ref 4–5.2)
SODIUM BLD-SCNC: 133 MMOL/L (ref 136–145)
TOTAL PROTEIN: 7.4 G/DL (ref 6.4–8.2)
WBC # BLD: 7.9 K/UL (ref 4–11)

## 2020-08-26 PROCEDURE — 74176 CT ABD & PELVIS W/O CONTRAST: CPT

## 2020-08-26 PROCEDURE — 96374 THER/PROPH/DIAG INJ IV PUSH: CPT

## 2020-08-26 PROCEDURE — 6360000002 HC RX W HCPCS: Performed by: NURSE PRACTITIONER

## 2020-08-26 PROCEDURE — 85025 COMPLETE CBC W/AUTO DIFF WBC: CPT

## 2020-08-26 PROCEDURE — 6370000000 HC RX 637 (ALT 250 FOR IP): Performed by: NURSE PRACTITIONER

## 2020-08-26 PROCEDURE — 80053 COMPREHEN METABOLIC PANEL: CPT

## 2020-08-26 PROCEDURE — 83690 ASSAY OF LIPASE: CPT

## 2020-08-26 PROCEDURE — 83605 ASSAY OF LACTIC ACID: CPT

## 2020-08-26 PROCEDURE — 99283 EMERGENCY DEPT VISIT LOW MDM: CPT

## 2020-08-26 RX ORDER — MORPHINE SULFATE 4 MG/ML
4 INJECTION, SOLUTION INTRAMUSCULAR; INTRAVENOUS ONCE
Status: COMPLETED | OUTPATIENT
Start: 2020-08-26 | End: 2020-08-26

## 2020-08-26 RX ADMIN — MAGNESIUM HYDROXIDE 30 ML: 400 SUSPENSION ORAL at 22:12

## 2020-08-26 RX ADMIN — MORPHINE SULFATE 4 MG: 4 INJECTION INTRAVENOUS at 22:11

## 2020-08-26 RX ADMIN — NALOXEGOL OXALATE 25 MG: 25 TABLET, FILM COATED ORAL at 22:54

## 2020-08-26 ASSESSMENT — PAIN SCALES - GENERAL
PAINLEVEL_OUTOF10: 9
PAINLEVEL_OUTOF10: 8

## 2020-08-27 VITALS
RESPIRATION RATE: 18 BRPM | TEMPERATURE: 98.2 F | HEART RATE: 62 BPM | OXYGEN SATURATION: 96 % | SYSTOLIC BLOOD PRESSURE: 116 MMHG | DIASTOLIC BLOOD PRESSURE: 68 MMHG

## 2020-08-27 RX ADMIN — Medication 240 ML: at 00:08

## 2020-08-27 ASSESSMENT — ENCOUNTER SYMPTOMS
SHORTNESS OF BREATH: 0
RECTAL PAIN: 1
CONSTIPATION: 1
ABDOMINAL PAIN: 1
NAUSEA: 0
DIARRHEA: 0
VOMITING: 0
CHEST TIGHTNESS: 0

## 2020-08-27 NOTE — ED NOTES
Per pt and , good results from enema. Pt 'feels more relaxed'. Shayy Salinas NP aware.       James Gant RN  08/27/20 2357

## 2020-08-27 NOTE — ED PROVIDER NOTES
[facility]        Pt Name: Evgeny Crews  MRN: [id number]  [place] 1950  Date of evaluation: 8/26/2020  Provider: CECI Shepherd - MORRO  PCP: Catalino Valle MD     I have seen and evaluated this patient with my supervising physician Junita Dancer, MD.    [facility]       Chief Complaint   Patient presents with    Constipation     pt with recent surgery for breast CA on August 18th. pt states hasn't had bm in 8 days and has not been able to urinate since early this morning. HISTORY OF PRESENT ILLNESS   (Location, Timing/Onset, Context/Setting, Quality, Duration, Modifying Factors, Severity, Associated Signs and Symptoms)  Note limiting factors. Evgeny Crews is a 68 y.o. female presents to the emergency department complaining of constipation with some anal leakage of liquid stool, cramping and pain in the rectum. States that she is been unable to have a bowel movement in 8 days. She did have a mastectomy with lymph ectomy on right side on August 18. She is been taking pain medication. States that she called her PCP today and they told her to switch to tylenol due to likely opioid induced constipation. Reports that she is unable to urinate due to constipation. Denies any headache, fever, lightheadedness, dizziness, visual disturbances. No neck or back pain. No shortness of breath, cough, or congestion. No nausea, vomiting, diarrhea, or dysuria. No rash. Nursing Notes were all reviewed and agreed with or any disagreements were addressed in the HPI. REVIEW OF SYSTEMS    (2-9 systems for level 4, 10 or more for level 5)     Review of Systems   Constitutional: Negative for activity change, chills and fever. Respiratory: Negative for chest tightness and shortness of breath. Cardiovascular: Negative for chest pain. Gastrointestinal: Positive for abdominal pain, constipation and rectal pain. Negative for diarrhea, nausea and vomiting. Genitourinary: Negative for dysuria. All other systems reviewed and are negative. Positives and Pertinent negatives as per HPI. Except as noted above in the ROS, all other systems were reviewed and negative. PAST MEDICAL HISTORY     Past Medical History:   Diagnosis Date    Asthma     CKD (chronic kidney disease) stage 4, GFR 15-29 ml/min ([facility])     Hyperlipidemia     Hypertension     Kidney stones     Memory loss     Osteopenia          SURGICAL HISTORY     Past Surgical History:   Procedure Laterality Date    BREAST LUMPECTOMY Right 8/18/2020    RIGHT BREAST LOCALIZIED PARTIAL MASTECTOMY; SENTINEL LYMPH NODE BIOPSY, BIOZORB, TECHNETIUM NINETY-NINE AND BLUE DYE IN O.R. performed by Allie Arias MD at [address]       Discharge Medication List as of 8/27/2020  1:32 AM      CONTINUE these medications which have NOT CHANGED    Details   albuterol sulfate HFA (VENTOLIN HFA) 108 (90 Base) MCG/ACT inhaler Inhale 2 puffs into the lungs every 6 hours as needed for WheezingHistorical Med      ciprofloxacin (CIPRO) 250 MG tablet Take 250 mg by mouth 2 times dailyHistorical Med      simvastatin (ZOCOR) 20 MG tablet TAKE 1 TABLET BY MOUTH AT BEDTIME, R-3Historical Med      losartan (COZAAR) 25 MG tablet Take 12.5 mg by mouth daily Historical Med      montelukast (SINGULAIR) 10 MG tablet Take 10 mg by mouth nightlyHistorical Med      budesonide-formoterol (SYMBICORT) 80-4.5 MCG/ACT AERO Inhale 2 puffs into the lungs daily as needed NOT TAKINGHistorical Med               ALLERGIES     Patient has no known allergies. FAMILYHISTORY     History reviewed. No pertinent family history. SOCIAL HISTORY       Social History     Tobacco Use    Smoking status: Never Smoker    Smokeless tobacco: Never Used   Substance Use Topics    Alcohol use: No    Drug use:  No SCREENINGS             PHYSICAL EXAM    (up to 7 for level 4, 8 or more for level 5)     ED Triage Vitals [08/26/20 2042]   BP Temp Temp Source Pulse Resp SpO2 Height Weight   137/76 98.2 °F (36.8 °C) Temporal 57 17 97 % -- --       Physical Exam  Vitals signs and nursing note reviewed. Constitutional:       Appearance: She is well-developed. She is not diaphoretic. HENT:      Head: Normocephalic and atraumatic. Right Ear: External ear normal.      Left Ear: External ear normal.   Eyes:      General:         Right eye: No discharge. Left eye: No discharge. Neck:      Musculoskeletal: Normal range of motion and neck supple. Vascular: No JVD. Cardiovascular:      Rate and Rhythm: Normal rate and regular rhythm. Pulses: Normal pulses. Heart sounds: Normal heart sounds. Pulmonary:      Effort: Pulmonary effort is normal. No respiratory distress. Abdominal:      Palpations: Abdomen is soft. Musculoskeletal: Normal range of motion. Skin:     General: Skin is warm and dry. Coloration: Skin is not pale. Neurological:      Mental Status: She is alert and oriented to person, place, and time.    Psychiatric:         Behavior: Behavior normal.         DIAGNOSTIC RESULTS   LABS:    Labs Reviewed   CBC WITH AUTO DIFFERENTIAL - Abnormal; Notable for the following components:       Result Value    Lymphocytes Absolute 0.9 (*)     All other components within normal limits    Narrative:     Performed at:  [facility] [address], [place], [address]   Phone [phone]   COMPREHENSIVE METABOLIC PANEL - Abnormal; Notable for the following components:    Sodium 133 (*)     Chloride 97 (*)     Glucose 116 (*)     BUN 31 (*)     CREATININE 2.1 (*)     GFR Non- 23 (*)     GFR  28 (*)     All other components within normal limits    Narrative:     Performed at:  [facility]  [address] [facility],  [place], [place]   Phone [phone]   LACTATE, SEPSIS    Narrative:     Performed at:  [facility]  [address] [facility],  [place], [address]   Phone [phone]   LIPASE    Narrative:     Performed at:  [facility]  [address] [facility],  [place], [address]   Phone [phone]       All other labs were within normal range or not returned as of this dictation. EKG: All EKG's are interpreted by the Emergency Department Physician in the absence of a cardiologist.  Please see their note for interpretation of EKG. RADIOLOGY:   Non-plain film images such as CT, Ultrasound and MRI are read by the radiologist. Plain radiographic images are visualized and preliminarily interpreted by the ED Provider with the below findings:        Interpretation per the Radiologist below, if available at the time of this note:    CT ABDOMEN PELVIS WO CONTRAST Additional Contrast? None   Final Result   Moderate-to-large amount of stool throughout the colon, particularly within a   distended rectosigmoid colon which shows wall thickening and adjacent fat   stranding. Correlation for stercoral colitis is recommended. Both kidneys show atrophy and multiple cysts, more pronounced on the right. Ct Abdomen Pelvis Wo Contrast Additional Contrast? None    Result Date: 8/26/2020  EXAMINATION: CT OF THE ABDOMEN AND PELVIS WITHOUT CONTRAST 8/26/2020 9:30 pm TECHNIQUE: CT of the abdomen and pelvis was performed without the administration of intravenous contrast. Multiplanar reformatted images are provided for review. Dose modulation, iterative reconstruction, and/or weight based adjustment of the mA/kV was utilized to reduce the radiation dose to as low as reasonably achievable.  COMPARISON: None available HISTORY: ORDERING SYSTEM PROVIDED HISTORY: severe constipation TECHNOLOGIST PROVIDED HISTORY: Reason for exam:->severe constipation Additional Contrast?->None Reason for Exam: severe constipation Acuity: Acute Type of Exam: Initial Relevant Medical/Surgical History: Constipation (pt with recent surgery for breast CA on August 18th. pt states hasn't had bm in 8 days and has not been able to urinate since early this morning. ) History of recent surgery for breast cancer August 18th. No bowel movement in 8 days. History of kidney stones, hypertension, hyperlipidemia, hysterectomy, lithotripsy, right breast lumpectomy 08/18/2020 FINDINGS: Lower Chest: Calcified posterior right pleural plaque is noted. Organs: Noncontrast images of the liver, gallbladder, spleen, pancreas, adrenal glands, and kidneys show no acute abnormality. Both kidneys are atrophic and contain multiple cysts; atrophy is more pronounced on the right. GI/Bowel: Small hiatal hernia is identified. The stomach, small bowel, independent show no abnormality. There is a moderate-to-large amount of stool throughout the colon including within a distended rectosigmoid colon which shows wall thickening and mild adjacent fat stranding. Pelvis: The uterus and urinary bladder show no acute noncontrast abnormality. Peritoneum/Retroperitoneum: The aorta is normal in caliber and course, showing mild calcifications. Bones/soft tissues: No acute bony abnormality. L4-5 degenerative anterolisthesis is present. No acute bony abnormality. Small umbilical hernia containing only fat. No free or focal fluid collection. No extraluminal gas or pathologically enlarged abdominopelvic lymph nodes. Moderate-to-large amount of stool throughout the colon, particularly within a distended rectosigmoid colon which shows wall thickening and adjacent fat stranding. Correlation for stercoral colitis is recommended. Both kidneys show atrophy and multiple cysts, more pronounced on the right.            PROCEDURES   Unless otherwise noted below, none     Procedures    CRITICAL CARE TIME N/A    CONSULTS:  None      EMERGENCY DEPARTMENT COURSE and DIFFERENTIAL DIAGNOSIS/MDM:   Vitals:    Vitals:    08/27/20 0015 08/27/20 0030 08/27/20 0045 08/27/20 0100   BP: 114/71 116/65 (!) 114/58 116/68   Pulse:    62   Resp:    18   Temp:       TempSrc:       SpO2:  96% 97% 96%       Patient was given the following medications:  Medications   magnesium hydroxide (MILK OF MAGNESIA) 400 MG/5ML suspension 30 mL (30 mLs Oral Given 8/26/20 2212)   morphine injection 4 mg (4 mg Intravenous Given 8/26/20 2211)   naloxegol (MOVANTIK) tablet 25 mg (25 mg Oral Given 8/26/20 2254)   milk and molasses enema 240 mL (240 mLs Rectal Given 8/27/20 0008)           Briefly, this is a 68year old female that presents to the emergency department complaining of constipation with some anal leakage of liquid stool, cramping and pain in the rectum. States that she is been unable to have a bowel movement in 8 days. She did have a mastectomy with lymph ectomy on right side on August 18. She is been taking pain medication. States that she called her PCP today and they told her to switch to tylenol due to likely opioid induced constipation. Reports that she is unable to urinate due to constipation. CT ABDOMEN PELVIS WO CONTRAST Additional Contrast? None (Final result)   Result time 08/26/20 22:15:48   Final result by Adan Posada MD (08/26/20 22:15:48)                 Impression:     Moderate-to-large amount of stool throughout the colon, particularly within a   distended rectosigmoid colon which shows wall thickening and adjacent fat   stranding.  Correlation for stercoral colitis is recommended. Both kidneys show atrophy and multiple cysts, more pronounced on the right. Lactate 1.1. Lipase 29.0. CMP shows CKD with creatinine of 2.1. CBC is unremarkable. Patient did have large stool output, feels much better. She was given Movantik tonight as well as a milk of molasses enema.   She will be started on

## 2020-08-27 NOTE — ED NOTES
Enema given to pt with harder stool removed. Pt tolerating procedure and sitting on bedside commode at present.  at bedside.       Sudheer Ivey RN  08/27/20 [id number]

## 2020-08-29 NOTE — ED PROVIDER NOTES
Please see the [facility] note, patient presented for evaluation of fecal impaction, constipation secondary to narcotic analgesia postsurgical.  No evidence of sepsis bowel obstruction, she was given promotility agents disimpacted, she has no evidence of bowel obstruction or free air. Impression: Constipation, medication side effect.      Talib Vyas MD  23/34/47 5317

## 2020-08-31 NOTE — PROGRESS NOTES
PCP:  Medical Oncology: All Failing  Radiation:  Other:      pT1bN0  STAGE:  IA right breast cancer      Ms. Zacarias Dodson is a 68y.o.-year-old woman who is now s/p right partial mastectomy with sentinel lymph node biospy for right breast cancer. She underwent this procedure on 2020 and tolerated it well. She is doing quite well postoperatively and her pain is continuing to improve. INTERVAL HISTORY:  On 2020 she underwent a right partial mastectomy with sentinel lymph node biopsy. Pathology identified 9 mm of grade 2 invasive lobular carcinoma. LCIS is also identified. ER positive [place] negative HER-2 negative. Margins are negative. There is 0/2 lymph nodes involved with carcinoma. [id number]       Pathology:    Department of Pathology   FINAL SURGICAL PATHOLOGY REPORT   Patient Name: Zabrina Burns           Accession No:  [id number]    Age Sex:   1950    69 Y / F       Location:      [facility]   Account No:   [de-identified]                  Collected:     2020   Med Rec No:    [id number]                 Received:      2020   Attend Phys: Millie SOTO             Completed:     2020   Perform Phys: Millie SOTO             FINAL DIAGNOSIS:        A. Right breast tissue, excision:        - Invasive lobular carcinoma, Margie grade 2 (see SYNOPTIC          REPORT).      - Resection margins negative for invasive lobular carcinoma (closest          margin: 5mm from inferior).      - Lobular carcinoma in situ.          Note: LCIS is associated with invasive lobular carcinoma as well          as background breast tissue away from the invasive cancer.        B. Right breast tissue new deep margin, excision:        - Negative for carcinoma.        C. Right axillary sentinel lymph node #1, excision:        - No metastasis in one lymph node by H&E and PAN KERATIN stains          (0/1).    D.  Right axillary sentinel lymph node #2, excision:        - No metastasis in one lymph node by H&E and PAN KERATIN stains          (0/1). COMMENT:     SYNOPTIC REPORT FOR INVASIVE CARCINOMA OF THE BREAST     Specimen Identification     Procedure   Excision (less than total mastectomy)     Specimen Laterality   Right     Tumor Size   Greatest dimension of largest invasive focus: 9 mm     Histologic Type   Invasive lobular carcinoma   Note: E-cadherin stain shows loss of membranous staining, supporting   lobular differentiation. Histologic Grade ([place] Histologic Score)   Glandular (Acinar)/Tubular Differentiation   Score 3 (<10% of tumor area forming glandular/tubular structures)   Nuclear Pleomorphism   Score 2 (cells larger than normal with open vesicular nuclei, visible      nucleoli, and moderate variability in both size and shape)   Mitotic Rate   Score 1 (?3 mitoses per mm2)   Overall Grade   Grade 2 (scores of 6 or 7)     Ductal Carcinoma In Situ (DCIS)   Not identified     Lobular Carcinoma In Situ (LCIS)   Present     Margins   Invasive Carcinoma Margins   Uninvolved by invasive carcinoma    Distance from closest margin: 5mm    Specify closest margin: Inferior     Regional Lymph Nodes   Uninvolved by tumor cells        Number of Lymph Nodes Examined: 2        Number of [place] Nodes Examined: 2     Treatment Effect   No known presurgical therapy     Lymphovascular Invasion   Not identified     Pathologic Stage Classification (pTNM, AJCC 8th Edition)   Primary Tumor (Invasive Carcinoma) (pT)   pT1b: Tumor >5 mm but ? 10 mm in greatest dimension   Modifier    (sn): [place] node(s) evaluated. Category (pN)   pN0: No regional lymph node metastasis identified     Biomarkers (Performed on block A8):      Estrogen receptor: Positive (>90%, strong intensity)      Progesterone receptor: Negative (<1%)      HER2 IHC: Negative (Score 1+)     Performed by immunohistochemistry with manual quantitation.    Interpretation reference range:   ER and NH: Nuclear staining of 1% or more of tumor cells showing weak or   greater intensity is interpreted as positive. Her2/henry scoring follows the ASCO/CAP guidelines:   Score 0: no staining or membrane staining that is incomplete and is   faint/barely perceptible in < =10% of the tumor cells (reported   negative). Score 1+: incomplete membrane staining that is faint//barely perceptible   in >10% of the tumor cells (reported negative). Score 2+: weak to moderate complete membrane staining observed in >10% of   the tumor cells (reported equivocal). Score 3+: circumferential membrane staining that is complete, intense and   in >10% of the tumor cells (reported positive). Selected cases, including all 2+/equivocal for Her2/henry on routine IHC   staining, will be sent for Her2/henry analysis by [facility], those results to be   the subject of a separate report. Cold ischemia time: Less than 1 hour   Duration of formalin fixation: Greater than 6 hours and less than 72   hours     ER and MI assessment by immunohistochemistry (IHC) was performed in   accordance with the guidelines issued by the American Society of Clinical   Oncologists-College of American Pathologists (ASCO-CAP) Ana Rosa BYRNE et   al, Arch Pathol Lab Med. 2010 Jul;134(7). ER clone is [place] SP1. MI   clone is [place] clone [id number]. Test is performed on formalin fixed paraffin   embedded tissue using [place] iVIEW DAB detection kit (Biotin   streptavidin). Positive and negative control tissue shows appropriate   staining. HER2 assessment by immunohistochemistry (IHC) was performed in accordance   with the guidelines issued by the American Society of Clinical   Oncologists-College of American Pathologists (ASCO-CAP) [facility], et al.   J Clin Oncol. 2018 Jul 10;36(20):3254-8983.). The stain was performed on   [employer], and performed with the [place] ultraView   Universal DAB detection kit. These assays have not been validated on   decalcified tissue.  Results should be interpreted with caution given the   likelihood of false negativity on decalcified specimens. Analyte specific reagent (ASR) disclaimer: The use of one or more   reagents in the above tests is regulated as an analyte specific reagent. These tests were developed and their performance characteristics   determined by the [facility] of [facility]. They have   not been cleared by the [employer] and Drug Administration. The FDA has   determined that such clearance or approval is not necessary.         JINMI/JINMI               Exam:  General: no acute distress  Breast: There is a well healing scar on the  right breast. There is a similarly well healing ipsilateral axillary scar. There is no active drainage or signs of infection. There is no ecchymosis, apparent hematomas or significant seromas present. She has good range of motion with her arm. Respiratory: respirations are non-labored and there is no audible distress  Cardiovascular: regular rate, extremities appear well perfused  Neurologic: alert, oriented      Assessment/Plan:  pT1bN0  STAGE:  IA right breast cancer  ER positive [place] negative HER-2 negative  S/p PM with SLNB    Her pathology results were reviewed with her in detail today. She was provided a copy of her pathology report for her records. Systemic treatment was reviewed. She will have a follow up next week with medical oncology in order to further discuss these adjuvant treatments. We reviewed that radiation therapy is recommended in patients undergoing breast conservation therapy to reduce the risk of local recurrence. She will follow up with radiation oncology to discuss planning. She is now approximately 2 weeks postop and healing very well. She should be able to begin radiation treatments in the next 1-2 weeks. We will make a referral.    At this time she can resume normal activity and wearing her regular bras.  She should be fully healed in another 6 weeks at which time she can begin massage with lotion to soften scar tissue. I encouraged her to continue self breast evaluation. Follow up surveillance was discussed. Our plan at this time is to follow up with surgical breast oncology office in 3 months. At that time we can re-evaluate her and set her up for new baseline imaging. All of the patient's questions were answered at this time, but she was encouraged to call the office with any further inquiries.

## 2020-09-04 ENCOUNTER — OFFICE VISIT (OUTPATIENT)
Dept: SURGERY | Age: 70
End: 2020-09-04

## 2020-09-04 VITALS
DIASTOLIC BLOOD PRESSURE: 64 MMHG | OXYGEN SATURATION: 98 % | SYSTOLIC BLOOD PRESSURE: 116 MMHG | HEART RATE: 46 BPM | BODY MASS INDEX: 29.08 KG/M2 | WEIGHT: 144 LBS

## 2020-09-04 PROCEDURE — 99024 POSTOP FOLLOW-UP VISIT: CPT | Performed by: SURGERY

## 2020-11-14 NOTE — PROGRESS NOTES
Prior to Visit   Medication Sig Dispense Refill    anastrozole (ARIMIDEX) 1 MG tablet TAKE 1 TABLET BY MOUTH ONCE DAILY      simvastatin (ZOCOR) 20 MG tablet TAKE 1 TABLET BY MOUTH AT BEDTIME  3    losartan (COZAAR) 25 MG tablet Take 12.5 mg by mouth daily       montelukast (SINGULAIR) 10 MG tablet Take 10 mg by mouth nightly      albuterol sulfate HFA (VENTOLIN HFA) 108 (90 Base) MCG/ACT inhaler Inhale 2 puffs into the lungs every 6 hours as needed for Wheezing       No current facility-administered medications on file prior to visit. No family history on file. Social History     Tobacco Use    Smoking status: Never Smoker    Smokeless tobacco: Never Used   Substance Use Topics    Alcohol use: No        No results for input(s): WBC, HGB, HCT, MCV, PLT in the last 720 hours. Chemistry        Component Value Date/Time     (L) 08/26/2020 2209    K 4.0 08/26/2020 2209    CL 97 (L) 08/26/2020 2209    CO2 26 08/26/2020 2209    BUN 31 (H) 08/26/2020 2209    CREATININE 2.1 (H) 08/26/2020 2209        Component Value Date/Time    CALCIUM 9.6 08/26/2020 2209    ALKPHOS 62 08/26/2020 2209    AST 19 08/26/2020 2209    ALT 10 08/26/2020 2209    BILITOT 0.5 08/26/2020 2209          Lab Results   Component Value Date    ALT 10 08/26/2020    AST 19 08/26/2020    ALKPHOS 62 08/26/2020    BILITOT 0.5 08/26/2020     No results found for: LABA1C  No results found for: EAG    Review of Systems   Constitutional: Negative for activity change, appetite change, fatigue, fever and unexpected weight change. HENT: Negative for congestion, rhinorrhea, sinus pressure and trouble swallowing. Respiratory: Negative for cough, chest tightness, shortness of breath and wheezing. Cardiovascular: Negative for chest pain, palpitations and leg swelling. Gastrointestinal: Negative for abdominal distention, abdominal pain, blood in stool, constipation, diarrhea, nausea and vomiting.    Genitourinary: Negative for dysuria, frequency and hematuria. Musculoskeletal: Negative for arthralgias and back pain. Skin: Negative for rash. Neurological: Negative for dizziness, weakness, light-headedness, numbness and headaches. Wt Readings from Last 3 Encounters:   11/16/20 151 lb 9.6 oz (68.8 kg)   09/04/20 144 lb (65.3 kg)   08/18/20 148 lb (67.1 kg)       BP Readings from Last 3 Encounters:   11/16/20 110/77   09/04/20 116/64   08/27/20 116/68       Pulse Readings from Last 3 Encounters:   11/16/20 50   09/04/20 (!) 46   08/27/20 62       /77   Pulse 50   Temp 97 °F (36.1 °C)   Ht 4' 11\" (1.499 m)   Wt 151 lb 9.6 oz (68.8 kg)   SpO2 98%   BMI 30.62 kg/m²      Physical Exam  Vitals signs reviewed. Constitutional:       General: She is awake. She is not in acute distress. Appearance: She is obese. She is not ill-appearing or diaphoretic. HENT:      Head: Normocephalic and atraumatic. Right Ear: External ear normal.      Left Ear: External ear normal.      Nose: Nose normal.   Eyes:      Extraocular Movements: Extraocular movements intact. Conjunctiva/sclera: Conjunctivae normal.   Neck:      Musculoskeletal: Normal range of motion. No erythema. Cardiovascular:      Rate and Rhythm: Normal rate and regular rhythm. Pulmonary:      Effort: Pulmonary effort is normal. No respiratory distress. Breath sounds: No wheezing, rhonchi or rales. Abdominal:      General: Abdomen is flat. There is no distension. Palpations: Abdomen is soft. Musculoskeletal: Normal range of motion. General: No deformity. Right lower leg: No edema. Left lower leg: No edema. Skin:     Coloration: Skin is not cyanotic, jaundiced or pale. Findings: No abrasion, abscess, bruising, ecchymosis, erythema, signs of injury, laceration, lesion, petechiae, rash or wound. Neurological:      General: No focal deficit present. Mental Status: She is alert. Mental status is at baseline. Coordination: Coordination normal.   Psychiatric:         Attention and Perception: Attention and perception normal.         Mood and Affect: Mood and affect normal.         Speech: Speech normal.         Behavior: Behavior normal. Behavior is cooperative. Thought Content: Thought content normal.       Assessment/Plan:   Angie Garcia was seen today for established new doctor, asthma, breast cancer and acute renal failure. Diagnoses and all orders for this visit:    Encounter to establish care with new doctor  Comments: Will finish breast CA treatment first before sending for colon CA screening. Malignant neoplasm of right female breast, unspecified estrogen receptor status, unspecified site of breast [facility])  Comments:  Managed by oncology and surgeon. CKD (chronic kidney disease) stage 4, GFR 15-29 ml/min (formerly [facility])  Comments:  Managed by nephrology. Mild intermittent asthma without complication  Comments:  Stable and controlled. Continue Albuterol PRN. Osteopenia of multiple sites  Comments:  Continue Vit D3 as directed by nephrologist.    Mixed hyperlipidemia  -     Lipid Panel; Future  -     Lipid Panel    Need for hepatitis C screening test  -     Hepatitis C Antibody; Future  -     Hepatitis C Antibody    Immunization due  -     Tdap (age 6y and older)  ([email or web address])       I reviewed the plan of care with the patient. Patient acknowledged understanding and agreed with plan of care overall. Medications Discontinued During This Encounter   Medication Reason    naloxegol (MOVANTIK) 25 MG TABS tablet LIST CLEANUP    budesonide-formoterol (SYMBICORT) 80-4.5 MCG/ACT AERO LIST CLEANUP        Patient was informed that whether starting or adding a new medication or increasing the dose of a current medication, the benefits and risks have to be considered and weighed over, especially if the patient is taking other medications as well.  Patient was also informed that there are no medications that have no side effects and there is always a risk involved with taking a medication. If a side effect were to occur with starting a new medication or with increasing the dose of a current medication that either the medication can be totally discontinued altogether or simply decrease the dose of it and based on this, a follow-up appointment is necessary. The drug allergy list will then be updated with the corresponding side effects if it's deemed to be a true 'drug allergy'. The most common adverse effects of medication(s) were addressed at today's visit. Lastly, the patient was informed that the coverage status of a medication may vary from insurance to insurance and the only way to verify if the medication is covered is to send an actual prescription in. The patient was also informed that the cost of medications vary from insurance to insurance. I reviewed the plan of care with the patient. Patient acknowledged understanding and agreed with plan of care overall. Estimated length of time of today's visit: 30 minutes    Follow-up: Return in about 3 months (around 2/16/2021) for asthma, arthritis. . Patient was informed that if his or her symptoms worsen to return here or go to nearest ER if symptoms significantly worsen. Noé Priest M.D.   [address] [place] [address]    Electronically signed by Sarah Ceja on 11/17/2020 at 5:40 PM.

## 2020-11-16 ENCOUNTER — OFFICE VISIT (OUTPATIENT)
Dept: FAMILY MEDICINE CLINIC | Age: 70
End: 2020-11-16
Payer: MEDICARE

## 2020-11-16 VITALS
SYSTOLIC BLOOD PRESSURE: 110 MMHG | BODY MASS INDEX: 30.56 KG/M2 | WEIGHT: 151.6 LBS | HEIGHT: 59 IN | OXYGEN SATURATION: 98 % | TEMPERATURE: 97 F | HEART RATE: 50 BPM | DIASTOLIC BLOOD PRESSURE: 77 MMHG

## 2020-11-16 LAB
CHOLESTEROL, TOTAL: 149 MG/DL (ref 0–199)
HDLC SERPL-MCNC: 69 MG/DL (ref 40–60)
HEPATITIS C ANTIBODY INTERPRETATION: NORMAL
LDL CHOLESTEROL CALCULATED: 67 MG/DL
TRIGL SERPL-MCNC: 64 MG/DL (ref 0–150)
VLDLC SERPL CALC-MCNC: 13 MG/DL

## 2020-11-16 PROCEDURE — 90715 TDAP VACCINE 7 YRS/> IM: CPT | Performed by: FAMILY MEDICINE

## 2020-11-16 PROCEDURE — 99213 OFFICE O/P EST LOW 20 MIN: CPT | Performed by: FAMILY MEDICINE

## 2020-11-16 PROCEDURE — 90471 IMMUNIZATION ADMIN: CPT | Performed by: FAMILY MEDICINE

## 2020-11-16 RX ORDER — ANASTROZOLE 1 MG/1
TABLET ORAL
COMMUNITY
Start: 2020-10-03

## 2020-11-16 ASSESSMENT — PATIENT HEALTH QUESTIONNAIRE - PHQ9
SUM OF ALL RESPONSES TO PHQ QUESTIONS 1-9: 0
SUM OF ALL RESPONSES TO PHQ QUESTIONS 1-9: 0
1. LITTLE INTEREST OR PLEASURE IN DOING THINGS: 0
SUM OF ALL RESPONSES TO PHQ QUESTIONS 1-9: 0
SUM OF ALL RESPONSES TO PHQ9 QUESTIONS 1 & 2: 0
2. FEELING DOWN, DEPRESSED OR HOPELESS: 0

## 2020-11-17 PROBLEM — C50.911 MALIGNANT NEOPLASM OF RIGHT FEMALE BREAST (HCC): Status: ACTIVE | Noted: 2020-11-17

## 2020-11-17 ASSESSMENT — ENCOUNTER SYMPTOMS
NAUSEA: 0
ABDOMINAL PAIN: 0
RHINORRHEA: 0
BACK PAIN: 0
DIARRHEA: 0
CONSTIPATION: 0
BLOOD IN STOOL: 0
SINUS PRESSURE: 0
SHORTNESS OF BREATH: 0
VOMITING: 0
CHEST TIGHTNESS: 0
WHEEZING: 0
COUGH: 0
ABDOMINAL DISTENTION: 0
TROUBLE SWALLOWING: 0

## 2020-11-23 ENCOUNTER — HOSPITAL ENCOUNTER (OUTPATIENT)
Age: 70
Discharge: HOME | End: 2020-11-23
Payer: MEDICARE

## 2020-11-23 LAB
ALBUMIN SERPL-MCNC: 4.2 G/DL (ref 3.4–5)
ANION GAP SERPL CALCULATED.3IONS-SCNC: 19 MMOL/L (ref 3–16)
BILIRUBIN URINE: NEGATIVE
BLOOD, URINE: ABNORMAL
BUN BLDV-MCNC: 42 MG/DL (ref 7–20)
CALCIUM SERPL-MCNC: 9.4 MG/DL (ref 8.3–10.6)
CHLORIDE BLD-SCNC: 99 MMOL/L (ref 99–110)
CLARITY: CLEAR
CO2: 21 MMOL/L (ref 21–32)
COLOR: YELLOW
COMMENT UA: NORMAL
CREAT SERPL-MCNC: 2.9 MG/DL (ref 0.6–1.2)
EPITHELIAL CELLS, UA: 2 /HPF (ref 0–5)
GFR AFRICAN AMERICAN: 19
GFR NON-AFRICAN AMERICAN: 16
GLUCOSE BLD-MCNC: 165 MG/DL (ref 70–99)
GLUCOSE URINE: NEGATIVE MG/DL
HCT VFR BLD CALC: 42.2 % (ref 36–48)
HEMOGLOBIN: 13.6 G/DL (ref 12–16)
HYALINE CASTS: 3 /LPF (ref 0–8)
KETONES, URINE: NEGATIVE MG/DL
LEUKOCYTE ESTERASE, URINE: NEGATIVE
MCH RBC QN AUTO: 28.2 PG (ref 26–34)
MCHC RBC AUTO-ENTMCNC: 32.1 G/DL (ref 31–36)
MCV RBC AUTO: 87.6 FL (ref 80–100)
MICROSCOPIC EXAMINATION: YES
NITRITE, URINE: NEGATIVE
PDW BLD-RTO: 13.9 % (ref 12.4–15.4)
PH UA: 6 (ref 5–8)
PHOSPHORUS: 4 MG/DL (ref 2.5–4.9)
PLATELET # BLD: 98 K/UL (ref 135–450)
PLATELET SLIDE REVIEW: ABNORMAL
PMV BLD AUTO: 10 FL (ref 5–10.5)
POTASSIUM SERPL-SCNC: 3.9 MMOL/L (ref 3.5–5.1)
PROTEIN UA: 100 MG/DL
RBC # BLD: 4.81 M/UL (ref 4–5.2)
RBC UA: 3 /HPF (ref 0–4)
SLIDE REVIEW: ABNORMAL
SODIUM BLD-SCNC: 139 MMOL/L (ref 136–145)
SPECIFIC GRAVITY UA: 1.02 (ref 1–1.03)
URINE TYPE: ABNORMAL
UROBILINOGEN, URINE: 0.2 E.U./DL
WBC # BLD: 3.8 K/UL (ref 4–11)
WBC UA: 2 /HPF (ref 0–5)

## 2020-11-23 PROCEDURE — 81001 URINALYSIS AUTO W/SCOPE: CPT

## 2020-11-23 PROCEDURE — 36415 COLL VENOUS BLD VENIPUNCTURE: CPT

## 2020-11-23 PROCEDURE — 80069 RENAL FUNCTION PANEL: CPT

## 2020-11-23 PROCEDURE — 85027 COMPLETE CBC AUTOMATED: CPT

## 2020-12-04 PROBLEM — N17.9 AKI (ACUTE KIDNEY INJURY) (HCC): Status: ACTIVE | Noted: 2020-12-04

## 2020-12-08 ENCOUNTER — OFFICE VISIT (OUTPATIENT)
Dept: SURGERY | Age: 70
End: 2020-12-08
Payer: MEDICARE

## 2020-12-08 VITALS
HEIGHT: 59 IN | OXYGEN SATURATION: 98 % | DIASTOLIC BLOOD PRESSURE: 79 MMHG | TEMPERATURE: 97.6 F | SYSTOLIC BLOOD PRESSURE: 127 MMHG | RESPIRATION RATE: 18 BRPM | HEART RATE: 68 BPM | BODY MASS INDEX: 28.22 KG/M2 | WEIGHT: 140 LBS

## 2020-12-08 PROCEDURE — G8400 PT W/DXA NO RESULTS DOC: HCPCS | Performed by: NURSE PRACTITIONER

## 2020-12-08 PROCEDURE — 3017F COLORECTAL CA SCREEN DOC REV: CPT | Performed by: NURSE PRACTITIONER

## 2020-12-08 PROCEDURE — G8427 DOCREV CUR MEDS BY ELIG CLIN: HCPCS | Performed by: NURSE PRACTITIONER

## 2020-12-08 PROCEDURE — G9899 SCRN MAM PERF RSLTS DOC: HCPCS | Performed by: NURSE PRACTITIONER

## 2020-12-08 PROCEDURE — 4040F PNEUMOC VAC/ADMIN/RCVD: CPT | Performed by: NURSE PRACTITIONER

## 2020-12-08 PROCEDURE — G8484 FLU IMMUNIZE NO ADMIN: HCPCS | Performed by: NURSE PRACTITIONER

## 2020-12-08 PROCEDURE — 1123F ACP DISCUSS/DSCN MKR DOCD: CPT | Performed by: NURSE PRACTITIONER

## 2020-12-08 PROCEDURE — 1036F TOBACCO NON-USER: CPT | Performed by: NURSE PRACTITIONER

## 2020-12-08 PROCEDURE — 1090F PRES/ABSN URINE INCON ASSESS: CPT | Performed by: NURSE PRACTITIONER

## 2020-12-08 PROCEDURE — G8417 CALC BMI ABV UP PARAM F/U: HCPCS | Performed by: NURSE PRACTITIONER

## 2020-12-08 PROCEDURE — 99213 OFFICE O/P EST LOW 20 MIN: CPT | Performed by: NURSE PRACTITIONER

## 2020-12-08 NOTE — PROGRESS NOTES
Surgical Breast Oncology      Primary Care Provider:   Medical Oncologist: Devon Ramesh   Radiation Oncologist: Maynor       CC: 3 month [id number] for breast check and mammogram    pT1bN0  STAGE:  IA right breast cancer     HPI: Kendy Baker is a 68 y.o. woman here for 3 month follow up for breast check secondary to personal history of right breast cancer. She is s/p right partial mastectomy with SLNB(0/2) on 8/18/2020 for 9 mm of grade 2 invasive lobular carcinoma, LCIS, ER positive [place] negative HER-2 negative, negative margins, s/p adjuvant radiation therapy. Started Anastrozole. She has no breast related concerns today. She states that she does perform routine self breast evaluations and has not noticed any new abnormalities such as masses, skin changes, color changes,nipple discharge, or changes to the nipple-areolar complex. INTERVAL HX:  On 8/18/2020 she underwent a right partial mastectomy with sentinel lymph node biopsy. Pathology identified 9 mm of grade 2 invasive lobular carcinoma. LCIS is also identified. ER positive [place] negative HER-2 negative. Margins are negative. There is 0/2 lymph nodes involved with carcinoma. [id number]     10/2020 - Initiated Anastrozole     11/5/2020-11/25/2020 completed accelerated whole breast radiation        Past Medical History:   Diagnosis Date    Asthma     Breast cancer ([facility] [address]     CKD (chronic kidney disease) stage 4, GFR 15-29 ml/min ([facility])     Hyperlipidemia     Hypertension     Kidney stones     Memory loss     Osteopenia        Past Surgical History:   Procedure Laterality Date    BREAST LUMPECTOMY Right 8/18/2020    RIGHT BREAST LOCALIZIED PARTIAL MASTECTOMY; SENTINEL LYMPH NODE BIOPSY, BIOZORB, TECHNETIUM NINETY-NINE AND BLUE DYE IN O.R. performed by Joshua Quintero [facility] at [facility] LITHOTRIPSY         History reviewed. No pertinent family history.     Allergies as of 12/08/2020    (No Known Allergies)       Social History     Tobacco Use    Smoking status: Never Smoker    Smokeless tobacco: Never Used   Substance Use Topics    Alcohol use: No    Drug use: No       Current Outpatient Medications on File Prior to Visit   Medication Sig Dispense Refill    anastrozole (ARIMIDEX) 1 MG tablet TAKE 1 TABLET BY MOUTH ONCE DAILY      albuterol sulfate HFA (VENTOLIN HFA) 108 (90 Base) MCG/ACT inhaler Inhale 2 puffs into the lungs every 6 hours as needed for Wheezing      simvastatin (ZOCOR) 20 MG tablet TAKE 1 TABLET BY MOUTH AT BEDTIME  3    losartan (COZAAR) 25 MG tablet Take 12.5 mg by mouth daily       montelukast (SINGULAIR) 10 MG tablet Take 10 mg by mouth nightly      amLODIPine (NORVASC) 2.5 MG tablet Take 1 tablet by mouth daily (Patient not taking: Reported on 12/8/2020) 30 tablet 2     No current facility-administered medications on file prior to visit. Medications: documentation has been reviewed in the electronic medical record and patient office intake form. REVIEW OF SYSTEMS:  Constitutional: Negative for fever  HENT: Negative for sore throat  Eyes: Negative for redness   Respiratory: Negative for dyspnea, cough  Cardiovascular: Negative for chest pain  Gastrointestinal: Negative for vomiting, diarrhea   Genitourinary: Negative for hematuria   Musculoskeletal: Negative for arthralgias   Skin: Negative for rash  Neurological: Negative for syncope  Hematological: Negative for adenopathy  Psychiatric/Behavorial: Negative for anxiety         PHYSICAL EXAM:  /79 (Site: Left Wrist, Position: Sitting, Cuff Size: Medium Adult)   Pulse 68   Temp 97.6 °F (36.4 °C) (Temporal)   Resp 18   Ht 4' 11\" (1.499 m)   Wt 140 lb (63.5 kg)   SpO2 98%   BMI 28.28 kg/m²   Constitutional: She appearswell-nourished. No apparent distress. Breast: The patient was examined in the upright and supine position. Right: Well healed scar and axillary scar.   Surgical and radiation related changes noted. Skin thickening and scabbing noted near lateral areola. No new masses or changes in breast contour. No nipple inversion or discharge. No erythema, thickening (peau d'orange), or dimpling. Left: No new masses or changes in breast contour. No skin changes of the breast or nipple areolar complex. No nipple inversion or discharge. No erythema, thickening (peau d'orange), or dimpling. There is no axillary lymphadenopathy palpated bilaterally. Head: Normocephalic and atraumatic:   Eyes: EOM are normal. Pupils are equal, round, and reactive to light. Neck: Neck supple. No tracheal deviation present. No obvious mass. Cardiovascular: regular rate. Pulmonary: No accessory muscle use. Respirations non-labored and no wheezing. Lymphatics: No palpable supraclavicular, cervical, or axillary lymphadenopathy  Skin: No rash noted. No erythema. Neurologic: alert and oriented. Extremities: appear well perfused. No edema. No joint deformity          ASSESSMENT:  - Screening Breast Examination - stable breast examination   - Personal History of Breast Cancer - s/p right partial mastectomy with SLNB(0/2) on 8/18/2020 for 9 mm of grade 2 invasive lobular carcinoma, LCIS, ER positive [place] negative HER-2 negative, negative margins. S/p adjuvant radiation therapy. - Aromatase inhibitor use - Anastrozole, started 10/2020  - Encounter for follow-up surveillance of breast cancer     PLAN:    Bilateral diagnostic mammogram in 6 months for new baseline imaging, due 5/2021  · Clinical breast exam every 6 months for 2 years, if stable after 2 years, can extend out to annually.  Continue endocrine therapy per medical oncology    Signs/symptoms of recurrence were reviewed. She verbalizes understanding that she should notify the office if she identifies any abnormalities on self evaluation.    Follow up cancer surveillance discussed    Discussed the importance of breast awareness including the importance and technique of self breast exams   Education provided for Healthy Lifestyle Recommendations: healthy diet, routine exercise, weight control, decreased alcohol consumption.  Follow up after mammogram in 6 months with Dr. Amaury Soares, APRN-CNP  [facility])   Surgical Breast Oncology   [phone]    All of the patient's questions were answered at this time however, she was encouraged to call the office with any further inquiries. Approximately 15 minutes of time were spent in this visit of which 50% or more of the time was related to coordination of care.

## 2021-04-01 ENCOUNTER — HOSPITAL ENCOUNTER (OUTPATIENT)
Age: 71
Discharge: HOME | End: 2021-04-01
Payer: MEDICARE

## 2021-04-01 DIAGNOSIS — N17.9 AKI (ACUTE KIDNEY INJURY) (HCC): ICD-10-CM

## 2021-04-01 DIAGNOSIS — N18.4 CHRONIC KIDNEY DISEASE, STAGE IV (SEVERE) (HCC): ICD-10-CM

## 2021-04-01 DIAGNOSIS — N18.4 CKD (CHRONIC KIDNEY DISEASE) STAGE 4, GFR 15-29 ML/MIN (HCC): ICD-10-CM

## 2021-04-01 DIAGNOSIS — N18.4 CKD (CHRONIC KIDNEY DISEASE), STAGE IV (HCC): ICD-10-CM

## 2021-04-01 LAB
ALBUMIN SERPL-MCNC: 4.2 G/DL (ref 3.4–5)
ANION GAP SERPL CALCULATED.3IONS-SCNC: 9 MMOL/L (ref 3–16)
BILIRUBIN URINE: NEGATIVE
BLOOD, URINE: ABNORMAL
BUN BLDV-MCNC: 35 MG/DL (ref 7–20)
CALCIUM SERPL-MCNC: 9.2 MG/DL (ref 8.3–10.6)
CHLORIDE BLD-SCNC: 99 MMOL/L (ref 99–110)
CLARITY: CLEAR
CO2: 29 MMOL/L (ref 21–32)
COLOR: YELLOW
CREAT SERPL-MCNC: 2.5 MG/DL (ref 0.6–1.2)
CREATININE URINE: 88.8 MG/DL (ref 28–259)
EPITHELIAL CELLS, UA: 1 /HPF (ref 0–5)
GFR AFRICAN AMERICAN: 23
GFR NON-AFRICAN AMERICAN: 19
GLUCOSE BLD-MCNC: 92 MG/DL (ref 70–99)
GLUCOSE URINE: NEGATIVE MG/DL
HCT VFR BLD CALC: 39.9 % (ref 36–48)
HEMOGLOBIN: 13.3 G/DL (ref 12–16)
HYALINE CASTS: 2 /LPF (ref 0–8)
KETONES, URINE: NEGATIVE MG/DL
LEUKOCYTE ESTERASE, URINE: NEGATIVE
MCH RBC QN AUTO: 28.9 PG (ref 26–34)
MCHC RBC AUTO-ENTMCNC: 33.4 G/DL (ref 31–36)
MCV RBC AUTO: 86.5 FL (ref 80–100)
MICROSCOPIC EXAMINATION: YES
NITRITE, URINE: NEGATIVE
PARATHYROID HORMONE INTACT: 38.8 PG/ML (ref 14–72)
PDW BLD-RTO: 13.2 % (ref 12.4–15.4)
PH UA: 6 (ref 5–8)
PHOSPHORUS: 4.5 MG/DL (ref 2.5–4.9)
PLATELET # BLD: 159 K/UL (ref 135–450)
PMV BLD AUTO: 9.1 FL (ref 5–10.5)
POTASSIUM SERPL-SCNC: 4 MMOL/L (ref 3.5–5.1)
PROTEIN PROTEIN: 70 MG/DL
PROTEIN UA: 100 MG/DL
RBC # BLD: 4.61 M/UL (ref 4–5.2)
RBC UA: 2 /HPF (ref 0–4)
SODIUM BLD-SCNC: 137 MMOL/L (ref 136–145)
SPECIFIC GRAVITY UA: 1.01 (ref 1–1.03)
URINE TYPE: ABNORMAL
UROBILINOGEN, URINE: 0.2 E.U./DL
WBC # BLD: 7.3 K/UL (ref 4–11)
WBC UA: 2 /HPF (ref 0–5)

## 2021-04-01 PROCEDURE — 81001 URINALYSIS AUTO W/SCOPE: CPT

## 2021-04-01 PROCEDURE — 84156 ASSAY OF PROTEIN URINE: CPT

## 2021-04-01 PROCEDURE — 83970 ASSAY OF PARATHORMONE: CPT

## 2021-04-01 PROCEDURE — 36415 COLL VENOUS BLD VENIPUNCTURE: CPT

## 2021-04-01 PROCEDURE — 82570 ASSAY OF URINE CREATININE: CPT

## 2021-04-01 PROCEDURE — 80069 RENAL FUNCTION PANEL: CPT

## 2021-04-01 PROCEDURE — 85027 COMPLETE CBC AUTOMATED: CPT

## 2021-06-14 ENCOUNTER — HOSPITAL ENCOUNTER (OUTPATIENT)
Dept: WOMENS IMAGING | Age: 71
Discharge: HOME | End: 2021-06-14
Payer: MEDICARE

## 2021-06-14 ENCOUNTER — OFFICE VISIT (OUTPATIENT)
Dept: SURGERY | Age: 71
End: 2021-06-14
Payer: MEDICARE

## 2021-06-14 VITALS — OXYGEN SATURATION: 97 % | HEART RATE: 52 BPM | SYSTOLIC BLOOD PRESSURE: 124 MMHG | DIASTOLIC BLOOD PRESSURE: 69 MMHG

## 2021-06-14 VITALS — WEIGHT: 145 LBS | BODY MASS INDEX: 29.23 KG/M2 | HEIGHT: 59 IN

## 2021-06-14 DIAGNOSIS — Z12.39 ENCOUNTER FOR SCREENING BREAST EXAMINATION: ICD-10-CM

## 2021-06-14 DIAGNOSIS — Z90.11 S/P PARTIAL MASTECTOMY, RIGHT: ICD-10-CM

## 2021-06-14 DIAGNOSIS — Z85.3 H/O MALIGNANT NEOPLASM OF FEMALE BREAST: ICD-10-CM

## 2021-06-14 DIAGNOSIS — Z12.31 ENCOUNTER FOR SCREENING MAMMOGRAM FOR MALIGNANT NEOPLASM OF BREAST: Primary | ICD-10-CM

## 2021-06-14 DIAGNOSIS — Z85.3 ENCOUNTER FOR FOLLOW-UP SURVEILLANCE OF BREAST CANCER: ICD-10-CM

## 2021-06-14 DIAGNOSIS — Z85.3 HISTORY OF BREAST CANCER: ICD-10-CM

## 2021-06-14 DIAGNOSIS — Z08 ENCOUNTER FOR FOLLOW-UP SURVEILLANCE OF BREAST CANCER: Primary | ICD-10-CM

## 2021-06-14 DIAGNOSIS — Z85.3 PERSONAL HISTORY OF BREAST CANCER: ICD-10-CM

## 2021-06-14 DIAGNOSIS — Z08 ENCOUNTER FOR FOLLOW-UP SURVEILLANCE OF BREAST CANCER: ICD-10-CM

## 2021-06-14 DIAGNOSIS — Z85.3 ENCOUNTER FOR FOLLOW-UP SURVEILLANCE OF BREAST CANCER: Primary | ICD-10-CM

## 2021-06-14 PROCEDURE — G8427 DOCREV CUR MEDS BY ELIG CLIN: HCPCS | Performed by: SURGERY

## 2021-06-14 PROCEDURE — G0279 TOMOSYNTHESIS, MAMMO: HCPCS

## 2021-06-14 PROCEDURE — G8400 PT W/DXA NO RESULTS DOC: HCPCS | Performed by: SURGERY

## 2021-06-14 PROCEDURE — 3017F COLORECTAL CA SCREEN DOC REV: CPT | Performed by: SURGERY

## 2021-06-14 PROCEDURE — 1090F PRES/ABSN URINE INCON ASSESS: CPT | Performed by: SURGERY

## 2021-06-14 PROCEDURE — 99213 OFFICE O/P EST LOW 20 MIN: CPT | Performed by: SURGERY

## 2021-06-14 PROCEDURE — G9899 SCRN MAM PERF RSLTS DOC: HCPCS | Performed by: SURGERY

## 2021-06-14 PROCEDURE — 1036F TOBACCO NON-USER: CPT | Performed by: SURGERY

## 2021-06-14 PROCEDURE — G8417 CALC BMI ABV UP PARAM F/U: HCPCS | Performed by: SURGERY

## 2021-06-14 PROCEDURE — 1123F ACP DISCUSS/DSCN MKR DOCD: CPT | Performed by: SURGERY

## 2021-06-14 PROCEDURE — 4040F PNEUMOC VAC/ADMIN/RCVD: CPT | Performed by: SURGERY

## 2021-06-14 NOTE — PROGRESS NOTES
PCP:  Medical Oncology: Christine Mckee  Radiation: Grass  Other:        pT1bN0  STAGE:  IA right breast cancer      Ms. Faraz Galeas is a 65y.o.-year-old woman who initially presented to me with  right breast cancer. Since her postoperative visit Ms. Faraz Galeas has been doing quite well. Her adjuvant treatment has included radiation and endocrine therapy. She has no new complaints today. INTERVAL HISTORY:  On 8/18/2020 she underwent a right partial mastectomy with sentinel lymph node biopsy. Pathology identified 9 mm of grade 2 invasive lobular carcinoma. LCIS is also identified. ER positive NM negative HER-2 negative. Margins are negative. There is 0/2 lymph nodes involved with carcinoma. [id number]     10/2020 - Initiated Anastrozole      11/5/202011/25/2020 completed accelerated whole breast radiation    On 6/14/2021 she underwent bilateral breast imaging. Stable changes are noted. Treatment related changes are identified in the right breast.  There are no new concerning findings suggestive of malignancy. BI-RADS 3. Exam:  Physical exam has been reviewed and updated  General: no acute distress  Breast:  The patient was examined in the upright and supine position. There is a well healed scar on the right breast. There is a similarly well healed ipsilateral axillary scar. There are expected  post surgical and radiation related changes. She has good range of motion with her arm. Her contralateral breast shows no new masses or changes in breast contour. There were no skin changes of the breast or nipple areolar complex. There was no nipple inversion or discharge. There is no axillary lymphadenopathy palpated bilaterally.   Respiratory: respirations are non-labored and there is no audible distress  Cardiovascular: regular rate, extremities appear well perfused  Neurologic: alert, oriented      Assessment/Plan:  pT1bN0  STAGE:  IA right breast cancer  ER positive NM negative HER-2 negative  S/p PM with SLNB  S/p XRT    Taking anastrazole    I reviewed her most recent breast imaging and physical exam findings. There are no current signs of recurrence. Signs/symptoms of recurrence were reviewed. She verbalizes understanding that she should notify our office if she identifies any abnormalities on self evaluation as it may require further workup. I encouraged her to continue self breast evaluation. Follow up surveillance was discussed. Our plan at this time is to follow up with surgical breast oncology office in ~6 months for a clinical breast exam and interval right breast imaging. She would like to move back to the [facility] at some point in the future. We generally discussed surveillance plans. If all is within normal limits at her next encounter we will plan for bilateral imaging and screening in June 2022 and likely annual thereafter. All of the patient's questions were answered at this time however, she was encouraged to call the office with any further inquiries. Approximately 20 minutes of time were spent in preparation, direct patient contact, care coordination, documentation and activities otherwise related to this encounter.

## 2021-07-02 ENCOUNTER — OFFICE VISIT (OUTPATIENT)
Dept: FAMILY MEDICINE CLINIC | Age: 71
End: 2021-07-02
Payer: MEDICARE

## 2021-07-02 VITALS
TEMPERATURE: 97.2 F | WEIGHT: 144.4 LBS | HEART RATE: 54 BPM | SYSTOLIC BLOOD PRESSURE: 128 MMHG | HEIGHT: 59 IN | DIASTOLIC BLOOD PRESSURE: 78 MMHG | BODY MASS INDEX: 29.11 KG/M2 | OXYGEN SATURATION: 96 %

## 2021-07-02 DIAGNOSIS — C50.911 MALIGNANT NEOPLASM OF RIGHT FEMALE BREAST, UNSPECIFIED ESTROGEN RECEPTOR STATUS, UNSPECIFIED SITE OF BREAST (HCC): ICD-10-CM

## 2021-07-02 DIAGNOSIS — J45.20 MILD INTERMITTENT ASTHMA WITHOUT COMPLICATION: ICD-10-CM

## 2021-07-02 DIAGNOSIS — Z76.89 ENCOUNTER TO ESTABLISH CARE WITH NEW DOCTOR: Primary | ICD-10-CM

## 2021-07-02 DIAGNOSIS — N18.4 CKD (CHRONIC KIDNEY DISEASE) STAGE 4, GFR 15-29 ML/MIN (HCC): ICD-10-CM

## 2021-07-02 DIAGNOSIS — E78.2 MIXED HYPERLIPIDEMIA: ICD-10-CM

## 2021-07-02 PROCEDURE — G8427 DOCREV CUR MEDS BY ELIG CLIN: HCPCS | Performed by: NURSE PRACTITIONER

## 2021-07-02 PROCEDURE — G8417 CALC BMI ABV UP PARAM F/U: HCPCS | Performed by: NURSE PRACTITIONER

## 2021-07-02 PROCEDURE — 4040F PNEUMOC VAC/ADMIN/RCVD: CPT | Performed by: NURSE PRACTITIONER

## 2021-07-02 PROCEDURE — G8400 PT W/DXA NO RESULTS DOC: HCPCS | Performed by: NURSE PRACTITIONER

## 2021-07-02 PROCEDURE — 1123F ACP DISCUSS/DSCN MKR DOCD: CPT | Performed by: NURSE PRACTITIONER

## 2021-07-02 PROCEDURE — 99214 OFFICE O/P EST MOD 30 MIN: CPT | Performed by: NURSE PRACTITIONER

## 2021-07-02 PROCEDURE — 1036F TOBACCO NON-USER: CPT | Performed by: NURSE PRACTITIONER

## 2021-07-02 PROCEDURE — G9899 SCRN MAM PERF RSLTS DOC: HCPCS | Performed by: NURSE PRACTITIONER

## 2021-07-02 PROCEDURE — 1090F PRES/ABSN URINE INCON ASSESS: CPT | Performed by: NURSE PRACTITIONER

## 2021-07-02 PROCEDURE — 3017F COLORECTAL CA SCREEN DOC REV: CPT | Performed by: NURSE PRACTITIONER

## 2021-07-02 ASSESSMENT — PATIENT HEALTH QUESTIONNAIRE - PHQ9
SUM OF ALL RESPONSES TO PHQ QUESTIONS 1-9: 0
SUM OF ALL RESPONSES TO PHQ9 QUESTIONS 1 & 2: 0
2. FEELING DOWN, DEPRESSED OR HOPELESS: 0
1. LITTLE INTEREST OR PLEASURE IN DOING THINGS: 0

## 2021-07-02 NOTE — PROGRESS NOTES
Lj Perez  : 1950  Encounter date: 2021    This is a 65 y.o. female who presents with  Chief Complaint   Patient presents with    New Patient     No concerns. Would like a PCP closer to home.  is a patient here. History of present illness:    HPI   1. Presents to clinic today to establish care with me. Last visit with a PCP in 2020. Last routine blood work in 2020. Follows with Breast specialist and Nephrologist.  Follows with Dr. Wilman Burnette every 6 months. Asthma  Uses inhaler very infrequently - currently takes like once yearly. Since starting on the Singulair has been helpful. Personal hx of Breast Cancer  Follows with Oncology every 6 months. Dx in  - had partial mastectomy 18. Radiation in November. Recovering well. CKD  Follows with Nephrology - Dr. Gin Palma. Next follow up in 2 months. No Known Allergies  Current Outpatient Medications   Medication Sig Dispense Refill    amLODIPine (NORVASC) 2.5 MG tablet Take 1 tablet by mouth once daily 90 tablet 1    anastrozole (ARIMIDEX) 1 MG tablet TAKE 1 TABLET BY MOUTH ONCE DAILY      albuterol sulfate HFA (VENTOLIN HFA) 108 (90 Base) MCG/ACT inhaler Inhale 2 puffs into the lungs every 6 hours as needed for Wheezing      simvastatin (ZOCOR) 20 MG tablet TAKE 1 TABLET BY MOUTH AT BEDTIME  3    montelukast (SINGULAIR) 10 MG tablet Take 10 mg by mouth nightly       No current facility-administered medications for this visit. Review of Systems   Constitutional: Negative for activity change, appetite change, chills, fatigue and fever. Respiratory: Negative for cough and shortness of breath. Cardiovascular: Negative for chest pain and palpitations. Gastrointestinal: Negative for diarrhea, nausea and vomiting. Past medical, surgical, family and social history were reviewed and updated with the patient.     Objective:    /78 (Site: Left Upper Arm, Position: Sitting, Cuff Size: Medium Adult)   Pulse 54   Temp 97.2 °F (36.2 °C)   Ht 4' 11\" (1.499 m)   Wt 144 lb 6.4 oz (65.5 kg)   SpO2 96%   BMI 29.17 kg/m²   Weight: 144 lb 6.4 oz (65.5 kg)     BP Readings from Last 3 Encounters:   07/02/21 128/78   06/14/21 124/69   04/07/21 129/61     Wt Readings from Last 3 Encounters:   07/02/21 144 lb 6.4 oz (65.5 kg)   06/14/21 145 lb (65.8 kg)   04/07/21 148 lb (67.1 kg)       Physical Exam  Constitutional:       General: She is not in acute distress. Appearance: She is well-developed. HENT:      Head: Normocephalic and atraumatic. Cardiovascular:      Rate and Rhythm: Normal rate and regular rhythm. Heart sounds: Normal heart sounds, S1 normal and S2 normal.   Pulmonary:      Effort: Pulmonary effort is normal. No respiratory distress. Breath sounds: Normal breath sounds. Skin:     General: Skin is warm and dry. Neurological:      Mental Status: She is alert and oriented to person, place, and time. Psychiatric:         Thought Content: Thought content normal.         Judgment: Judgment normal.       Assessment/Plan    1. Encounter to establish care with new doctor  Brief review of medical records available to me in Epic. Followed closely by Nephrology/Oncology. Will have patient follow up in 6 months for Medicare annual wellness and chronic health conditions. 2. CKD (chronic kidney disease) stage 4, GFR 15-29 ml/min ([facility])  Continue follow up with Nephrology. 3. Malignant neoplasm of right female breast, unspecified estrogen receptor status, unspecified site of breast ([facility] [address]  Continue follow up with Oncology. 4. Mild intermittent asthma without complication  Stable. Continue on current medication regimen. 5. Mixed hyperlipidemia  Will complete repeat blood work with next office visit. Fran Zhong was counseled regarding symptoms of current diagnosis, course and complications of disease if inadequately treated.   Discussed side effects of medications,

## 2021-07-19 ASSESSMENT — ENCOUNTER SYMPTOMS
NAUSEA: 0
VOMITING: 0
COUGH: 0
DIARRHEA: 0
SHORTNESS OF BREATH: 0

## 2021-07-21 RX ORDER — SIMVASTATIN 20 MG
TABLET ORAL
Qty: 90 TABLET | Refills: 1 | Status: SHIPPED | OUTPATIENT
Start: 2021-07-21 | End: 2022-01-24 | Stop reason: SDUPTHER

## 2021-07-21 NOTE — TELEPHONE ENCOUNTER
Medication:   Requested Prescriptions     Pending Prescriptions Disp Refills    simvastatin (ZOCOR) 20 MG tablet 90 tablet 1     Sig: TAKE 1 TABLET BY MOUTH AT BEDTIME      Last Filled:      Patient Phone Number: [phone] (home) [phone] (work)    Last appt: 7/2/2021   Next appt: 1/3/2022    Last OARRS: No flowsheet data found.   PDMP Monitoring:    Last PDMP Cesar Paris as Reviewed [facility]):  Review User Review Instant Review Result          Preferred Pharmacy:   [address] [place] [address] [phone] [facility] [phone]   [address]  [address] [address]  Phone: [phone] Fax: [phone]

## 2021-08-25 ENCOUNTER — HOSPITAL ENCOUNTER (OUTPATIENT)
Age: 71
Discharge: HOME | End: 2021-08-25
Payer: MEDICARE

## 2021-08-25 DIAGNOSIS — N17.9 AKI (ACUTE KIDNEY INJURY) (HCC): ICD-10-CM

## 2021-08-25 DIAGNOSIS — R80.0 ISOLATED PROTEINURIA WITHOUT SPECIFIC MORPHOLOGIC LESION: ICD-10-CM

## 2021-08-25 LAB
ALBUMIN SERPL-MCNC: 3.9 G/DL (ref 3.4–5)
ANION GAP SERPL CALCULATED.3IONS-SCNC: 9 MMOL/L (ref 3–16)
BILIRUBIN URINE: NEGATIVE
BLOOD, URINE: ABNORMAL
BUN BLDV-MCNC: 33 MG/DL (ref 7–20)
CALCIUM SERPL-MCNC: 9.2 MG/DL (ref 8.3–10.6)
CHLORIDE BLD-SCNC: 104 MMOL/L (ref 99–110)
CLARITY: CLEAR
CO2: 25 MMOL/L (ref 21–32)
COLOR: YELLOW
CREAT SERPL-MCNC: 2.5 MG/DL (ref 0.6–1.2)
CREATININE URINE: 90.2 MG/DL (ref 28–259)
EPITHELIAL CELLS, UA: 1 /HPF (ref 0–5)
GFR AFRICAN AMERICAN: 23
GFR NON-AFRICAN AMERICAN: 19
GLUCOSE BLD-MCNC: 108 MG/DL (ref 70–99)
GLUCOSE URINE: NEGATIVE MG/DL
HCT VFR BLD CALC: 37.3 % (ref 36–48)
HEMOGLOBIN: 12.3 G/DL (ref 12–16)
HYALINE CASTS: 0 /LPF (ref 0–8)
KETONES, URINE: NEGATIVE MG/DL
LEUKOCYTE ESTERASE, URINE: NEGATIVE
MCH RBC QN AUTO: 28.6 PG (ref 26–34)
MCHC RBC AUTO-ENTMCNC: 33 G/DL (ref 31–36)
MCV RBC AUTO: 86.6 FL (ref 80–100)
MICROSCOPIC EXAMINATION: YES
NITRITE, URINE: NEGATIVE
PARATHYROID HORMONE INTACT: 42.1 PG/ML (ref 14–72)
PDW BLD-RTO: 13.5 % (ref 12.4–15.4)
PH UA: 6 (ref 5–8)
PHOSPHORUS: 3.2 MG/DL (ref 2.5–4.9)
PLATELET # BLD: 135 K/UL (ref 135–450)
PMV BLD AUTO: 9.2 FL (ref 5–10.5)
POTASSIUM SERPL-SCNC: 3.5 MMOL/L (ref 3.5–5.1)
PROTEIN PROTEIN: 113 MG/DL
PROTEIN UA: 100 MG/DL
RBC # BLD: 4.31 M/UL (ref 4–5.2)
RBC UA: 2 /HPF (ref 0–4)
SODIUM BLD-SCNC: 138 MMOL/L (ref 136–145)
SPECIFIC GRAVITY UA: 1.02 (ref 1–1.03)
URINE TYPE: ABNORMAL
UROBILINOGEN, URINE: 0.2 E.U./DL
WBC # BLD: 6.8 K/UL (ref 4–11)
WBC UA: 2 /HPF (ref 0–5)

## 2021-08-25 PROCEDURE — 82306 VITAMIN D 25 HYDROXY: CPT

## 2021-08-25 PROCEDURE — 36415 COLL VENOUS BLD VENIPUNCTURE: CPT

## 2021-08-25 PROCEDURE — 80069 RENAL FUNCTION PANEL: CPT

## 2021-08-25 PROCEDURE — 82570 ASSAY OF URINE CREATININE: CPT

## 2021-08-25 PROCEDURE — 85027 COMPLETE CBC AUTOMATED: CPT

## 2021-08-25 PROCEDURE — 83970 ASSAY OF PARATHORMONE: CPT

## 2021-08-25 PROCEDURE — 84156 ASSAY OF PROTEIN URINE: CPT

## 2021-08-25 PROCEDURE — 81001 URINALYSIS AUTO W/SCOPE: CPT

## 2021-08-26 LAB — VITAMIN D 25-HYDROXY: 36.9 NG/ML

## 2021-09-22 RX ORDER — MONTELUKAST SODIUM 10 MG/1
10 TABLET ORAL NIGHTLY
Qty: 90 TABLET | Refills: 1 | Status: SHIPPED | OUTPATIENT
Start: 2021-09-22 | End: 2022-04-04 | Stop reason: SDUPTHER

## 2021-09-22 NOTE — TELEPHONE ENCOUNTER
PT is requesting a refill on montelukast (SINGULAIR) 10 MG tablet to please be sent to [address] [place] [address] [phone]

## 2021-10-29 ENCOUNTER — HOSPITAL ENCOUNTER (OUTPATIENT)
Age: 71
Discharge: HOME | End: 2021-10-29
Payer: MEDICARE

## 2021-10-29 LAB
ALBUMIN SERPL-MCNC: 4.2 G/DL (ref 3.4–5)
ANION GAP SERPL CALCULATED.3IONS-SCNC: 12 MMOL/L (ref 3–16)
BILIRUBIN URINE: NEGATIVE
BLOOD, URINE: ABNORMAL
BUN BLDV-MCNC: 32 MG/DL (ref 7–20)
CALCIUM SERPL-MCNC: 9.5 MG/DL (ref 8.3–10.6)
CHLORIDE BLD-SCNC: 104 MMOL/L (ref 99–110)
CLARITY: CLEAR
CO2: 24 MMOL/L (ref 21–32)
COLOR: YELLOW
CREAT SERPL-MCNC: 2.7 MG/DL (ref 0.6–1.2)
CREATININE URINE: 77.8 MG/DL (ref 28–259)
EPITHELIAL CELLS, UA: 1 /HPF (ref 0–5)
GFR AFRICAN AMERICAN: 21
GFR NON-AFRICAN AMERICAN: 17
GLUCOSE BLD-MCNC: 83 MG/DL (ref 70–99)
GLUCOSE URINE: NEGATIVE MG/DL
HCT VFR BLD CALC: 36.6 % (ref 36–48)
HEMOGLOBIN: 12 G/DL (ref 12–16)
HYALINE CASTS: 2 /LPF (ref 0–8)
KETONES, URINE: NEGATIVE MG/DL
LEUKOCYTE ESTERASE, URINE: NEGATIVE
MCH RBC QN AUTO: 28.7 PG (ref 26–34)
MCHC RBC AUTO-ENTMCNC: 32.7 G/DL (ref 31–36)
MCV RBC AUTO: 87.8 FL (ref 80–100)
MICROSCOPIC EXAMINATION: YES
NITRITE, URINE: NEGATIVE
PARATHYROID HORMONE INTACT: 37.2 PG/ML (ref 14–72)
PDW BLD-RTO: 14 % (ref 12.4–15.4)
PH UA: 6 (ref 5–8)
PHOSPHORUS: 3.7 MG/DL (ref 2.5–4.9)
PLATELET # BLD: 155 K/UL (ref 135–450)
PMV BLD AUTO: 9.3 FL (ref 5–10.5)
POTASSIUM SERPL-SCNC: 4.1 MMOL/L (ref 3.5–5.1)
PROTEIN PROTEIN: 55 MG/DL
PROTEIN UA: 100 MG/DL
RBC # BLD: 4.16 M/UL (ref 4–5.2)
RBC UA: 1 /HPF (ref 0–4)
SODIUM BLD-SCNC: 140 MMOL/L (ref 136–145)
SPECIFIC GRAVITY UA: 1.01 (ref 1–1.03)
URINE TYPE: ABNORMAL
UROBILINOGEN, URINE: 0.2 E.U./DL
VITAMIN D 25-HYDROXY: 46.2 NG/ML
WBC # BLD: 5.5 K/UL (ref 4–11)
WBC UA: 1 /HPF (ref 0–5)

## 2021-10-29 PROCEDURE — 82570 ASSAY OF URINE CREATININE: CPT

## 2021-10-29 PROCEDURE — 85027 COMPLETE CBC AUTOMATED: CPT

## 2021-10-29 PROCEDURE — 83970 ASSAY OF PARATHORMONE: CPT

## 2021-10-29 PROCEDURE — 82306 VITAMIN D 25 HYDROXY: CPT

## 2021-10-29 PROCEDURE — 84156 ASSAY OF PROTEIN URINE: CPT

## 2021-10-29 PROCEDURE — 81001 URINALYSIS AUTO W/SCOPE: CPT

## 2021-10-29 PROCEDURE — 80069 RENAL FUNCTION PANEL: CPT

## 2021-10-29 PROCEDURE — 36415 COLL VENOUS BLD VENIPUNCTURE: CPT

## 2021-11-03 PROBLEM — I10 PRIMARY HYPERTENSION: Status: ACTIVE | Noted: 2021-11-03

## 2021-12-15 NOTE — PROGRESS NOTES
PCP:  Medical Oncology: Minda Austin  Radiation: Grass  Other:        pT1bN0  STAGE:  IA right breast cancer      Ms. Tyler Ricks is a 65y.o.-year-old woman who initially presented to me with  right breast cancer. Since her postoperative visit Ms. Tyler Ricks has been doing quite well. Her adjuvant treatment has included radiation and endocrine therapy. She has no new breast related concerns today. She would like to eventually return to the [facility] to live permanently. The cost of living is difficult for her and her  in the [place] and they feel as though they live to work. However medical care she reports to be not comparable and is having a difficult time making the decision on when to move. INTERVAL HISTORY:  On 8/18/2020 she underwent a right partial mastectomy with sentinel lymph node biopsy. Pathology identified 9 mm of grade 2 invasive lobular carcinoma. LCIS is also identified. ER positive MT negative HER-2 negative. Margins are negative. There is 0/2 lymph nodes involved with carcinoma. [id number]     10/2020 - Initiated Anastrozole      11/5/202011/25/2020 completed accelerated whole breast radiation    On 6/14/2021 she underwent bilateral breast imaging. Stable changes are noted. Treatment related changes are identified in the right breast.  There are no new concerning findings suggestive of malignancy. BI-RADS 3. On 12/20/2021 she underwent interval right breast imaging. Stable postsurgical changes are noted in the right breast. There are no new concerning findings suggestive of malignancy. BI-RADS 3. Exam:  Physical exam has been reviewed and updated  General: no acute distress  Breast:  The patient was examined in the upright and supine position. There is a well healed scar on the right breast. There is a similarly well healed ipsilateral axillary scar. There are expected  post surgical and radiation related changes. She has good range of motion with her arm.   Her contralateral breast shows no new masses or changes in breast contour. There were no skin changes of the breast or nipple areolar complex. There was no nipple inversion or discharge. There is no axillary lymphadenopathy palpated bilaterally. Respiratory: respirations are non-labored and there is no audible distress  Cardiovascular: regular rate, extremities appear well perfused  Neurologic: alert, oriented      Assessment/Plan:  pT1bN0  STAGE:  IA right breast cancer  ER positive UT negative HER-2 negative  S/p PM with SLNB  S/p XRT    Taking anastrazole    I reviewed her most recent breast imaging and physical exam findings. There are no current signs of recurrence. Signs/symptoms of recurrence were reviewed. She verbalizes understanding that she should notify our office if she identifies any abnormalities on self evaluation as it may require further workup. I encouraged her to continue self breast evaluation. Follow up surveillance was discussed. Our plan at this time is to follow up with surgical breast oncology office in ~6 months for a clinical breast exam and bilateral breast imaging. She would like to move back to the [facility] at some point in the future. We generally discussed surveillance plans but ultimately would recommend mammography while she is in good health. She will determine when to move back based on what feels most right for her. All of the patient's questions were answered at this time however, she was encouraged to call the office with any further inquiries. Approximately 25 minutes of time were spent in preparation, direct patient contact, care coordination, documentation and activities otherwise related to this encounter.

## 2021-12-20 ENCOUNTER — OFFICE VISIT (OUTPATIENT)
Dept: SURGERY | Age: 71
End: 2021-12-20
Payer: MEDICARE

## 2021-12-20 ENCOUNTER — HOSPITAL ENCOUNTER (OUTPATIENT)
Dept: WOMENS IMAGING | Age: 71
Discharge: HOME | End: 2021-12-20
Payer: MEDICARE

## 2021-12-20 VITALS
HEART RATE: 58 BPM | DIASTOLIC BLOOD PRESSURE: 69 MMHG | WEIGHT: 148 LBS | BODY MASS INDEX: 29.84 KG/M2 | HEIGHT: 59 IN | SYSTOLIC BLOOD PRESSURE: 128 MMHG | OXYGEN SATURATION: 95 % | RESPIRATION RATE: 18 BRPM

## 2021-12-20 VITALS — WEIGHT: 145 LBS | BODY MASS INDEX: 29.23 KG/M2 | HEIGHT: 59 IN

## 2021-12-20 DIAGNOSIS — Z85.3 PERSONAL HISTORY OF BREAST CANCER: Primary | ICD-10-CM

## 2021-12-20 DIAGNOSIS — Z12.39 SCREENING BREAST EXAMINATION: ICD-10-CM

## 2021-12-20 DIAGNOSIS — Z08 ENCOUNTER FOR FOLLOW-UP SURVEILLANCE OF BREAST CANCER: ICD-10-CM

## 2021-12-20 DIAGNOSIS — Z85.3 ENCOUNTER FOR FOLLOW-UP SURVEILLANCE OF BREAST CANCER: ICD-10-CM

## 2021-12-20 DIAGNOSIS — Z12.31 ENCOUNTER FOR SCREENING MAMMOGRAM FOR MALIGNANT NEOPLASM OF BREAST: ICD-10-CM

## 2021-12-20 DIAGNOSIS — Z85.3 H/O MALIGNANT NEOPLASM OF FEMALE BREAST: ICD-10-CM

## 2021-12-20 PROCEDURE — G0279 TOMOSYNTHESIS, MAMMO: HCPCS

## 2021-12-20 PROCEDURE — G8427 DOCREV CUR MEDS BY ELIG CLIN: HCPCS | Performed by: SURGERY

## 2021-12-20 PROCEDURE — 1036F TOBACCO NON-USER: CPT | Performed by: SURGERY

## 2021-12-20 PROCEDURE — 1123F ACP DISCUSS/DSCN MKR DOCD: CPT | Performed by: SURGERY

## 2021-12-20 PROCEDURE — G8417 CALC BMI ABV UP PARAM F/U: HCPCS | Performed by: SURGERY

## 2021-12-20 PROCEDURE — 3017F COLORECTAL CA SCREEN DOC REV: CPT | Performed by: SURGERY

## 2021-12-20 PROCEDURE — G9899 SCRN MAM PERF RSLTS DOC: HCPCS | Performed by: SURGERY

## 2021-12-20 PROCEDURE — G8484 FLU IMMUNIZE NO ADMIN: HCPCS | Performed by: SURGERY

## 2021-12-20 PROCEDURE — 1090F PRES/ABSN URINE INCON ASSESS: CPT | Performed by: SURGERY

## 2021-12-20 PROCEDURE — 99213 OFFICE O/P EST LOW 20 MIN: CPT | Performed by: SURGERY

## 2021-12-20 PROCEDURE — 4040F PNEUMOC VAC/ADMIN/RCVD: CPT | Performed by: SURGERY

## 2021-12-20 PROCEDURE — G8400 PT W/DXA NO RESULTS DOC: HCPCS | Performed by: SURGERY

## 2021-12-21 DIAGNOSIS — Z85.3 ENCOUNTER FOR FOLLOW-UP SURVEILLANCE OF BREAST CANCER: Primary | ICD-10-CM

## 2021-12-21 DIAGNOSIS — Z90.11 S/P PARTIAL MASTECTOMY, RIGHT: ICD-10-CM

## 2021-12-21 DIAGNOSIS — Z08 ENCOUNTER FOR FOLLOW-UP SURVEILLANCE OF BREAST CANCER: Primary | ICD-10-CM

## 2021-12-21 DIAGNOSIS — Z85.3 PERSONAL HISTORY OF BREAST CANCER: ICD-10-CM

## 2022-01-06 RX ORDER — ALBUTEROL SULFATE 90 UG/1
2 AEROSOL, METERED RESPIRATORY (INHALATION) EVERY 6 HOURS PRN
Qty: 18 G | Refills: 1 | Status: SHIPPED | OUTPATIENT
Start: 2022-01-06

## 2022-01-06 NOTE — TELEPHONE ENCOUNTER
----- Message from Gisela Card sent at 1/5/2022  6:35 PM EST -----  Subject: Refill Request    QUESTIONS  Name of Medication? albuterol sulfate HFA (VENTOLIN HFA) 108 (90 Base)   MCG/ACT inhaler  Patient-reported dosage and instructions? as needed for   How many days do you have left? 0  Preferred Pharmacy? [address]  Pharmacy phone number (if available)? [phone]  ---------------------------------------------------------------------------  --------------  Renée ARGUETA  What is the best way for the office to contact you? OK to leave message on   voicemail  Preferred Call Back Phone Number?  [phone]

## 2022-01-24 RX ORDER — SIMVASTATIN 20 MG
TABLET ORAL
Qty: 90 TABLET | Refills: 0 | Status: SHIPPED | OUTPATIENT
Start: 2022-01-24 | End: 2022-04-04 | Stop reason: SDUPTHER

## 2022-01-24 NOTE — TELEPHONE ENCOUNTER
Medication:   Requested Prescriptions     Pending Prescriptions Disp Refills    simvastatin (ZOCOR) 20 MG tablet 90 tablet 1     Sig: TAKE 1 TABLET BY MOUTH AT BEDTIME      Last Filled:      Patient Phone Number: [phone] (home) [phone] (work)    Last appt: 7/2/2021   Next appt: Visit date not found    Last OARRS: No flowsheet data found.   PDMP Monitoring:    Last PDMP Candace Kearney as Reviewed [facility]):  Review User Review Instant Review Result          Preferred Pharmacy:   [facility] [address] IVANIA JULES [address] [place] [address] [phone] [facility] [phone]  [address]  [address] [facility]  Phone: [phone] Fax: [phone]

## 2022-01-24 NOTE — TELEPHONE ENCOUNTER
PT is requesting a refill on simvastatin (ZOCOR) 20 MG tablet to please be called into [address] [place] [address] [phone]

## 2022-04-01 NOTE — TELEPHONE ENCOUNTER
THE [facility] called to get the patient's refills. montelukast (SINGULAIR) 10 MG tablet 90 tablet 1 9/22/2021     Sig - Route:  Take 1 tablet by mouth nightly - Oral      simvastatin (ZOCOR) 20 MG tablet 90 tablet 0 1/24/2022     Sig: TAKE 1 TABLET BY MOUTH AT BEDTIME      [place] [address]

## 2022-04-04 RX ORDER — MONTELUKAST SODIUM 10 MG/1
10 TABLET ORAL NIGHTLY
Qty: 90 TABLET | Refills: 0 | Status: SHIPPED | OUTPATIENT
Start: 2022-04-04 | End: 2022-07-14 | Stop reason: SDUPTHER

## 2022-04-04 RX ORDER — SIMVASTATIN 20 MG
TABLET ORAL
Qty: 90 TABLET | Refills: 0 | Status: SHIPPED | OUTPATIENT
Start: 2022-04-04 | End: 2022-07-14 | Stop reason: SDUPTHER

## 2022-04-04 NOTE — TELEPHONE ENCOUNTER
Ligia from [facility] called to check on the two meds refill.      montelukast (SINGULAIR) 10 MG tablet 90 tablet 1 9/22/2021     Sig - Route:  Take 1 tablet by mouth nightly - Oral      simvastatin (ZOCOR) 20 MG tablet 90 tablet 0 1/24/2022     Sig: TAKE 1 TABLET BY MOUTH AT BEDTIME    Sent to pharmacy as: Simvastatin 20 MG Oral Tablet (ZOCOR)      [employer]

## 2022-05-04 ENCOUNTER — NURSE TRIAGE (OUTPATIENT)
Dept: OTHER | Facility: CLINIC | Age: 72
End: 2022-05-04

## 2022-05-04 NOTE — TELEPHONE ENCOUNTER
Received call from [address]askjuan 22 at [facility] with Red Flag Complaint. Subjective: Caller states \"I feel the pain of my left shoulder. On and off. \"     Current Symptoms: left shoulder pain intermittent, NO chest pain or difficulty breathing    No Hx of MI or Angina  Pain started 2 days after having Pneumonia vaccine in that arm    Hx of similar pain in knee with fall  Denies injury    Onset: 2 weeks ago; intermittent    Associated Symptoms: NA    Pain Severity: 7/10; aching; intermittent    Temperature: Denies    What has been tried: Tylenol- helps briefly    LMP: NA Pregnant: NA    Recommended disposition: See in Office Today or Tomorrow. Patient agreeable. Care advice provided, patient verbalizes understanding; denies any other questions or concerns; instructed to call back for any new or worsening symptoms. Patient/Caller agrees with recommended disposition; writer provided warm transfer to [facility] at [facility] for appointment scheduling. Attention Provider: Thank you for allowing me to participate in the care of your patient. The patient was connected to triage in response to information provided to the ECC/PSC. Please do not respond through this encounter as the response is not directed to a shared pool.     Reason for Disposition   Patient wants to be seen    Protocols used: SHOULDER PAIN-ADULT-OH

## 2022-05-05 ENCOUNTER — OFFICE VISIT (OUTPATIENT)
Dept: FAMILY MEDICINE CLINIC | Age: 72
End: 2022-05-05
Payer: MEDICARE

## 2022-05-05 VITALS
OXYGEN SATURATION: 99 % | HEART RATE: 56 BPM | WEIGHT: 150 LBS | DIASTOLIC BLOOD PRESSURE: 80 MMHG | BODY MASS INDEX: 30.3 KG/M2 | TEMPERATURE: 97.2 F | SYSTOLIC BLOOD PRESSURE: 126 MMHG

## 2022-05-05 DIAGNOSIS — M79.622 LEFT UPPER ARM PAIN: Primary | ICD-10-CM

## 2022-05-05 PROCEDURE — 1036F TOBACCO NON-USER: CPT | Performed by: NURSE PRACTITIONER

## 2022-05-05 PROCEDURE — 1090F PRES/ABSN URINE INCON ASSESS: CPT | Performed by: NURSE PRACTITIONER

## 2022-05-05 PROCEDURE — 3017F COLORECTAL CA SCREEN DOC REV: CPT | Performed by: NURSE PRACTITIONER

## 2022-05-05 PROCEDURE — 1123F ACP DISCUSS/DSCN MKR DOCD: CPT | Performed by: NURSE PRACTITIONER

## 2022-05-05 PROCEDURE — G8427 DOCREV CUR MEDS BY ELIG CLIN: HCPCS | Performed by: NURSE PRACTITIONER

## 2022-05-05 PROCEDURE — G8400 PT W/DXA NO RESULTS DOC: HCPCS | Performed by: NURSE PRACTITIONER

## 2022-05-05 PROCEDURE — 4040F PNEUMOC VAC/ADMIN/RCVD: CPT | Performed by: NURSE PRACTITIONER

## 2022-05-05 PROCEDURE — G8417 CALC BMI ABV UP PARAM F/U: HCPCS | Performed by: NURSE PRACTITIONER

## 2022-05-05 PROCEDURE — 99213 OFFICE O/P EST LOW 20 MIN: CPT | Performed by: NURSE PRACTITIONER

## 2022-05-05 NOTE — PROGRESS NOTES
Alva Counter  : 1950  Encounter date: 2022    This annalisa 66 y.o. female who presents with  Chief Complaint   Patient presents with    Other     LT shoulder pain, possible arthritis       History of present illness:    HPI PT is 66year old female with intermittent L shoulder pain started 2 weeks ago after receiving pneumonia vaccination. Reports concerned about vaccination side effect or arthritis. Pt is planning trip back to  [employer] and wants reassurance. Pt with history of OA in knees. Reports may have inflamed shoulder from repetition of overhead movements at work. Current Outpatient Medications on File Prior to Visit   Medication Sig Dispense Refill    simvastatin (ZOCOR) 20 MG tablet TAKE 1 TABLET BY MOUTH AT BEDTIME 90 tablet 0    montelukast (SINGULAIR) 10 MG tablet Take 1 tablet by mouth nightly 90 tablet 0    losartan (COZAAR) 25 MG tablet Take 1 tablet by mouth daily 90 tablet 1    albuterol sulfate HFA (VENTOLIN HFA) 108 (90 Base) MCG/ACT inhaler Inhale 2 puffs into the lungs every 6 hours as needed for Wheezing 18 g 1    anastrozole (ARIMIDEX) 1 MG tablet TAKE 1 TABLET BY MOUTH ONCE DAILY       No current facility-administered medications on file prior to visit. No Known Allergies  Past Medical History:   Diagnosis Date    Asthma     [facility] [address]     CKD (chronic kidney disease) stage 4, GFR 15-29 ml/min ([facility])     History of therapeutic radiation     Hyperlipidemia     Hypertension     Kidney stones     Memory loss     Osteopenia       Past Surgical History:   Procedure Laterality Date    BREAST BIOPSY      BREAST LUMPECTOMY Right 2020    RIGHT BREAST LOCALIZIED PARTIAL MASTECTOMY; SENTINEL LYMPH NODE BIOPSY, BIOZORB, TECHNETIUM NINETY-NINE AND BLUE DYE IN O.R. performed by Dorothy Marsh MD at [facility] LITHOTRIPSY        No family history on file.    Social History     Tobacco Use    Smoking status: Never Smoker    Smokeless tobacco: Never Used   Substance Use Topics    Alcohol use: No        Review of Systems    Objective:    /80 (Site: Left Upper Arm, Position: Sitting, Cuff Size: Medium Adult)   Pulse 56   Temp 97.2 °F (36.2 °C) (Infrared)   Wt 150 lb (68 kg)   SpO2 99%   BMI 30.30 kg/m²   Weight: 150 lb (68 kg)     BP Readings from Last 3 Encounters:   05/05/22 126/80   12/20/21 128/69   11/03/21 126/69     Wt Readings from Last 3 Encounters:   05/05/22 150 lb (68 kg)   12/20/21 145 lb (65.8 kg)   12/20/21 148 lb (67.1 kg)     BMI Readings from Last 3 Encounters:   05/05/22 30.30 kg/m²   12/20/21 29.29 kg/m²   12/20/21 29.89 kg/m²       Physical Exam  Vitals reviewed. Constitutional:       Appearance: Normal appearance. She is well-developed. Cardiovascular:      Rate and Rhythm: Normal rate and regular rhythm. Pulses: Normal pulses. Heart sounds: Normal heart sounds. No murmur heard. Pulmonary:      Effort: Pulmonary effort is normal.      Breath sounds: Normal breath sounds. Musculoskeletal:         General: Tenderness (BEBE, full ROM, no clicking or stiffness in AC, strength 5/5) present. No signs of injury. Skin:     General: Skin is warm and dry. Findings: No bruising, erythema or lesion. Neurological:      Mental Status: She is alert and oriented to person, place, and time. Psychiatric:         Mood and Affect: Mood normal.         Assessment/Plan    1. Left upper arm pain  Discussed differentials including OA, muscle strain  Advised warm/cool compress  OTC tylenol      Return if symptoms worsen or fail to improve, for unresolved symptoms. This dictation was generated by voice recognition computer software. Although all attempts are made to edit the dictation for accuracy, there may be errors in the transcription that are not intended.

## 2022-06-01 ENCOUNTER — NURSE TRIAGE (OUTPATIENT)
Dept: OTHER | Facility: CLINIC | Age: 72
End: 2022-06-01

## 2022-06-01 ENCOUNTER — OFFICE VISIT (OUTPATIENT)
Dept: FAMILY MEDICINE CLINIC | Age: 72
End: 2022-06-01
Payer: MEDICARE

## 2022-06-01 VITALS
TEMPERATURE: 97.7 F | SYSTOLIC BLOOD PRESSURE: 130 MMHG | BODY MASS INDEX: 30.5 KG/M2 | DIASTOLIC BLOOD PRESSURE: 80 MMHG | WEIGHT: 151 LBS

## 2022-06-01 DIAGNOSIS — G89.29 CHRONIC LEFT SHOULDER PAIN: Primary | ICD-10-CM

## 2022-06-01 DIAGNOSIS — M25.512 CHRONIC LEFT SHOULDER PAIN: Primary | ICD-10-CM

## 2022-06-01 PROCEDURE — G8417 CALC BMI ABV UP PARAM F/U: HCPCS | Performed by: FAMILY MEDICINE

## 2022-06-01 PROCEDURE — G8427 DOCREV CUR MEDS BY ELIG CLIN: HCPCS | Performed by: FAMILY MEDICINE

## 2022-06-01 PROCEDURE — 3017F COLORECTAL CA SCREEN DOC REV: CPT | Performed by: FAMILY MEDICINE

## 2022-06-01 PROCEDURE — 1090F PRES/ABSN URINE INCON ASSESS: CPT | Performed by: FAMILY MEDICINE

## 2022-06-01 PROCEDURE — 1036F TOBACCO NON-USER: CPT | Performed by: FAMILY MEDICINE

## 2022-06-01 PROCEDURE — G8400 PT W/DXA NO RESULTS DOC: HCPCS | Performed by: FAMILY MEDICINE

## 2022-06-01 PROCEDURE — 99213 OFFICE O/P EST LOW 20 MIN: CPT | Performed by: FAMILY MEDICINE

## 2022-06-01 PROCEDURE — 1123F ACP DISCUSS/DSCN MKR DOCD: CPT | Performed by: FAMILY MEDICINE

## 2022-06-01 ASSESSMENT — ENCOUNTER SYMPTOMS
BACK PAIN: 0
SHORTNESS OF BREATH: 0
CHEST TIGHTNESS: 1

## 2022-06-01 NOTE — TELEPHONE ENCOUNTER
Received call from Shannon Venegas at [facility] with Red Flag Complaint. Subjective: Caller states \"having left arm pain, has been seen in the past for same issue and is not getting any better. \"     Current Symptoms: left arm pain  Was seen at beginning of may for same issue, has not gotten any better  Pain is intermittent, will be from shoulder down the arm. Pain starts at shoulder and goes down the arm. Denies chest pain or difficulty breathing    Onset: 1 1/2 months    Associated Symptoms: NA    Pain Severity: 8/10; N/A; intermittent    Temperature: no fever    What has been tried: tylenol once a week    LMP: NA Pregnant: NA    Recommended disposition: See in Office Today or Tomorrow    Care advice provided, patient verbalizes understanding; denies any other questions or concerns; instructed to call back for any new or worsening symptoms. Patient/Caller agrees with recommended disposition; writer provided warm transfer to [place] at [facility] for appointment scheduling     Attention Provider: Thank you for allowing me to participate in the care of your patient. The patient was connected to triage in response to information provided to the ECC/PSC. Please do not respond through this encounter as the response is not directed to a shared pool.     Reason for Disposition   Patient wants to be seen    Protocols used: ARM PAIN-ADULT-OH

## 2022-06-01 NOTE — PROGRESS NOTES
SUBJECTIVE:    Cliff Marrufo is a 66 y.o. female who presents for a follow up visit. Chief Complaint   Patient presents with    Arm Pain     Patient c/o left arm/shoulder pain x 1 month. No known injury. Shoulder Pain   The pain is present in the left shoulder. This is a recurrent problem. The current episode started more than 1 month ago. The problem occurs daily. The quality of the pain is described as aching. The pain is mild. The symptoms are aggravated by activity. She has tried acetaminophen for the symptoms. The treatment provided mild relief. Patient's medications, allergies, past medical,surgical, social and family histories were reviewed and updated as appropriate. Past Medical History:   Diagnosis Date    Asthma     [facility] cancer ([facility] [address]     CKD (chronic kidney disease) stage 4, GFR 15-29 ml/min ([facility])     History of therapeutic radiation     Hyperlipidemia     Hypertension     Kidney stones     Memory loss     Osteopenia      Past Surgical History:   Procedure Laterality Date    BREAST BIOPSY      BREAST LUMPECTOMY Right 8/18/2020    RIGHT BREAST LOCALIZIED PARTIAL MASTECTOMY; SENTINEL LYMPH NODE BIOPSY, BIOZORB, TECHNETIUM NINETY-NINE AND BLUE DYE IN O.R. performed by Opal Jefferson MD at [address] [place] [address] LITHOTRIPSY       No family history on file.   Social History     Tobacco Use    Smoking status: Never Smoker    Smokeless tobacco: Never Used   Substance Use Topics    Alcohol use: No      No Known Allergies  Current Outpatient Medications on File Prior to Visit   Medication Sig Dispense Refill    simvastatin (ZOCOR) 20 MG tablet TAKE 1 TABLET BY MOUTH AT BEDTIME 90 tablet 0    montelukast (SINGULAIR) 10 MG tablet Take 1 tablet by mouth nightly 90 tablet 0    losartan (COZAAR) 25 MG tablet Take 1 tablet by mouth daily 90 tablet 1    albuterol sulfate HFA (VENTOLIN HFA) 108 (90 Base) MCG/ACT inhaler Inhale 2 puffs into the lungs every 6 hours as needed for Wheezing 18 g 1    anastrozole (ARIMIDEX) 1 MG tablet TAKE 1 TABLET BY MOUTH ONCE DAILY       No current facility-administered medications on file prior to visit. Review of Systems   Respiratory: Positive for chest tightness. Negative for shortness of breath. Cardiovascular: Negative for leg swelling. Musculoskeletal: Positive for arthralgias. Negative for back pain. Neurological: Negative for dizziness, light-headedness and headaches. OBJECTIVE:    /80   Temp 97.7 °F (36.5 °C)   Wt 151 lb (68.5 kg)   BMI 30.50 kg/m²    Physical Exam  Constitutional:       Appearance: She is well-developed. HENT:      Head: Normocephalic and atraumatic. Right Ear: External ear normal.      Left Ear: External ear normal.      Nose: Nose normal.   Eyes:      General:         Right eye: No discharge. Conjunctiva/sclera: Conjunctivae normal.   Neck:      Thyroid: No thyromegaly. Vascular: No JVD. Trachea: No tracheal deviation. Cardiovascular:      Rate and Rhythm: Normal rate and regular rhythm. Heart sounds: Murmur heard. Systolic murmur is present with a grade of 2/6. Pulmonary:      Effort: Pulmonary effort is normal. No respiratory distress. Breath sounds: Normal breath sounds. No rales. Musculoskeletal:      Left shoulder: Decreased range of motion ( due to pain). Cervical back: Normal range of motion and neck supple. Lymphadenopathy:      Cervical: No cervical adenopathy. Skin:     General: Skin is warm and dry. Neurological:      Mental Status: She is alert and oriented to person, place, and time. ASSESSMENT/PLAN:    Karen Quiros was seen today for arm pain. Diagnoses and all orders for this visit:    Chronic left shoulder pain  -     Zulay Bejarano MD, Orthopedic Surgery, [facility]        Return if symptoms worsen or fail to improve.     Please note portions of this note were completed with a voicerecognition program.  Efforts were made to edit the dictations but occasionally words are mis-transcribed.

## 2022-06-08 ENCOUNTER — OFFICE VISIT (OUTPATIENT)
Dept: ORTHOPEDIC SURGERY | Age: 72
End: 2022-06-08
Payer: MEDICARE

## 2022-06-08 VITALS — HEIGHT: 59 IN | BODY MASS INDEX: 30.64 KG/M2 | WEIGHT: 152 LBS

## 2022-06-08 DIAGNOSIS — M75.02 ADHESIVE CAPSULITIS OF LEFT SHOULDER: ICD-10-CM

## 2022-06-08 DIAGNOSIS — M25.512 LEFT SHOULDER PAIN, UNSPECIFIED CHRONICITY: Primary | ICD-10-CM

## 2022-06-08 PROCEDURE — G8417 CALC BMI ABV UP PARAM F/U: HCPCS | Performed by: ORTHOPAEDIC SURGERY

## 2022-06-08 PROCEDURE — 20610 DRAIN/INJ JOINT/BURSA W/O US: CPT | Performed by: ORTHOPAEDIC SURGERY

## 2022-06-08 PROCEDURE — 1123F ACP DISCUSS/DSCN MKR DOCD: CPT | Performed by: ORTHOPAEDIC SURGERY

## 2022-06-08 PROCEDURE — 1090F PRES/ABSN URINE INCON ASSESS: CPT | Performed by: ORTHOPAEDIC SURGERY

## 2022-06-08 PROCEDURE — 99203 OFFICE O/P NEW LOW 30 MIN: CPT | Performed by: ORTHOPAEDIC SURGERY

## 2022-06-08 PROCEDURE — 3017F COLORECTAL CA SCREEN DOC REV: CPT | Performed by: ORTHOPAEDIC SURGERY

## 2022-06-08 PROCEDURE — 1036F TOBACCO NON-USER: CPT | Performed by: ORTHOPAEDIC SURGERY

## 2022-06-08 PROCEDURE — G8427 DOCREV CUR MEDS BY ELIG CLIN: HCPCS | Performed by: ORTHOPAEDIC SURGERY

## 2022-06-08 PROCEDURE — G8400 PT W/DXA NO RESULTS DOC: HCPCS | Performed by: ORTHOPAEDIC SURGERY

## 2022-06-08 RX ORDER — TRIAMCINOLONE ACETONIDE 40 MG/ML
40 INJECTION, SUSPENSION INTRA-ARTICULAR; INTRAMUSCULAR ONCE
Status: COMPLETED | OUTPATIENT
Start: 2022-06-08 | End: 2022-06-08

## 2022-06-08 RX ORDER — BUPIVACAINE HYDROCHLORIDE 2.5 MG/ML
4 INJECTION, SOLUTION INFILTRATION; PERINEURAL ONCE
Status: COMPLETED | OUTPATIENT
Start: 2022-06-08 | End: 2022-06-08

## 2022-06-08 RX ORDER — LIDOCAINE HYDROCHLORIDE 10 MG/ML
4 INJECTION, SOLUTION INFILTRATION; PERINEURAL ONCE
Status: COMPLETED | OUTPATIENT
Start: 2022-06-08 | End: 2022-06-08

## 2022-06-08 RX ADMIN — TRIAMCINOLONE ACETONIDE 40 MG: 40 INJECTION, SUSPENSION INTRA-ARTICULAR; INTRAMUSCULAR at 16:01

## 2022-06-08 RX ADMIN — BUPIVACAINE HYDROCHLORIDE 10 MG: 2.5 INJECTION, SOLUTION INFILTRATION; PERINEURAL at 16:00

## 2022-06-08 RX ADMIN — LIDOCAINE HYDROCHLORIDE 4 ML: 10 INJECTION, SOLUTION INFILTRATION; PERINEURAL at 16:01

## 2022-06-08 NOTE — PROGRESS NOTES
ORTHOPAEDIC CONSULTATION NOTE    Chief Complaint   Patient presents with    New Patient     Lt Shoulder pain       HPI  6/8/22  66 y.o. female RHD seen in consultation at the request of Miguel Angel Soares MD for evaluation of left shoulder pain:  Onset 1.5 months ago  Injury/trauma none  Pain is located left lateral shoulder/brachium  No distal radiation  Denies left hand N/T  Worse with activity, raising her left arm  Better with rest, arm at side  Associated with loss of motion  Neck pain = no    I have reviewed and discussed the below pain assessment findings with the patient. Pain Assessment  Location of Pain: Shoulder  Location Modifiers: Left  Severity of Pain: 8  Quality of Pain: Sharp  Duration of Pain: Persistent  Frequency of Pain: Constant  Aggravating Factors: Stretching  Limiting Behavior: Some  Relieving Factors: Nsaids  Result of Injury: No  Work-Related Injury: No  Are there other pain locations you wish to document?: No    Review of Systems  I have read over the ROS from the Patient History Form dated on 6/8/22  Pertinent positives include weight change, asthma, hypertension, urinary frequency, chronic kidney disease, not on dialysis, breast cancer hx  Rest of 13 point ROS otherwise negative except per HPI, and scanned into the patient's chart under the Media tab.       No Known Allergies     Current Outpatient Medications   Medication Sig Dispense Refill    simvastatin (ZOCOR) 20 MG tablet TAKE 1 TABLET BY MOUTH AT BEDTIME 90 tablet 0    montelukast (SINGULAIR) 10 MG tablet Take 1 tablet by mouth nightly 90 tablet 0    losartan (COZAAR) 25 MG tablet Take 1 tablet by mouth daily 90 tablet 1    albuterol sulfate HFA (VENTOLIN HFA) 108 (90 Base) MCG/ACT inhaler Inhale 2 puffs into the lungs every 6 hours as needed for Wheezing 18 g 1    anastrozole (ARIMIDEX) 1 MG tablet TAKE 1 TABLET BY MOUTH ONCE DAILY       Current Facility-Administered Medications   Medication Dose Route Frequency Provider Last Rate Last Admin    triamcinolone acetonide (KENALOG-40) injection 40 mg  40 mg Intra-artICUlar Once Yessica Shultz MD        lidocaine 1 % injection 4 mL  4 mL Intra-artICUlar Once Yessica Shultz MD        bupivacaine (MARCAINE) 0.25 % injection 10 mg  4 mL Intra-artICUlar Once Yessica Shultz MD           Past Medical History:   Diagnosis Date    Asthma     [facility] ([facility] [address]     CKD (chronic kidney disease) stage 4, GFR 15-29 ml/min ([facility])     History of therapeutic radiation     Hyperlipidemia     Hypertension     Kidney stones     Memory loss     Osteopenia         Past Surgical History:   Procedure Laterality Date    BREAST BIOPSY      BREAST LUMPECTOMY Right 8/18/2020    RIGHT BREAST LOCALIZIED PARTIAL MASTECTOMY; SENTINEL LYMPH NODE BIOPSY, BIOZORB, TECHNETIUM NINETY-NINE AND BLUE DYE IN O.R. performed by Girish Ulloa MD at [address] [place] [address] (CERVIX STATUS UNKNOWN)      LITHOTRIPSY         No family history on file. Social History     Socioeconomic History    Marital status:      Spouse name: Not on file    Number of children: Not on file    Years of education: Not on file    Highest education level: Not on file   Occupational History    Not on file   Tobacco Use    Smoking status: Never Smoker    Smokeless tobacco: Never Used   Vaping Use    Vaping Use: Never used   Substance and Sexual Activity    Alcohol use: No    Drug use: No    Sexual activity: Not on file   Other Topics Concern    Not on file   Social History Narrative    Not on file     Social Determinants of Health     Financial Resource Strain:     Difficulty of Paying Living Expenses: Not on file   Food Insecurity:     Worried About [employer] [employer] in the Last Year: Not on file    [place] of Food in the Last Year: Not on file   Transportation Needs:     Lack of Transportation (Medical): Not on file    Lack of Transportation (Non-Medical):  Not on file Physical Activity:     Days of Exercise per Week: Not on file    Minutes of Exercise per Session: Not on file   Stress:     Feeling of Stress : Not on file   Social Connections:     Frequency of Communication with Friends and Family: Not on file    Frequency of Social Gatherings with Friends and Family: Not on file    Attends Synagogue Services: Not on file    Active Member of [address] [place] [address] or Organizations: Not on file    Attends Club or Organization Meetings: Not on file    Marital Status: Not on file   Intimate Partner Violence:     Fear of Current or Ex-Partner: Not on file    Emotionally Abused: Not on file    Physically Abused: Not on file    Sexually Abused: Not on file   Housing Stability:     Unable to Pay for Housing in the Last Year: Not on file    Number of [place] in the Last Year: Not on file    Unstable Housing in the Last Year: Not on file        Vitals:    06/08/22 0918   Weight: 152 lb (68.9 kg)   Height: 4' 11\" (1.499 m)       Physical Exam  Constitutional - well-groomed, well-nourished, Body mass index is 30.7 kg/m². Psychiatric - pleasant, normal mood & affect  Cardiovascular - RRR, negative UE peripheral edema, radial pulse 2+  Respiratory - respirations unlabored, on room air; mask on  Skin - no rashes, wounds, or lesions seen on exposed skin  Neck - no radicular pain with Spurling's test.  mildly decreased cervical ROM, no TTP of spinous processes, no TTP of paraspinal musculature  Neurological - LUE SILT M/U/R/A/LABC nerve distributions; AIN/PIN/IO intact  Left shoulder:   No obvious deformity/swelling/ecchymosis   No atrophy seen    No TTP over entire shoulder    Range of Motion:     Forward flexion/scaption:  98    Abduction:  Obligate FE    External rotation with arm at side:  15    Internal rotation:  Back pocket   Strength:    Abduction:  5/5     External rotation:  5/5    Special tests:    negative belly-press test        Imaging:  Images were personally reviewed by myself and discussed with the patient  Left shoulder 4 views performed 6/8/22 - glenohumeral articular height is preserved with no evidence of subchondral cystic changes or osteophytes, there are no loose bodies appreciated. Stella's line is preserved. On axillary view, the humeral head is well-centered within the glenoid. The acromioclavicular joint demonstrates no significant degenerative changes. The tuberosities are normal in appearance. Assessment & Plan:  66 y.o. female who presents with    Diagnosis Orders   1. Left shoulder pain, unspecified chronicity  XR SHOULDER LEFT (MIN 2 VIEWS)    VT ARTHROCENTESIS ASPIR&/INJ MAJOR JT/BURSA W/O US   2. Adhesive capsulitis of left shoulder  [facility]           Procedures    VT ARTHROCENTESIS ASPIR&/INJ MAJOR JT/BURSA W/O US       Thank you Dr Divya Olivares for referring Jen Cordoba to me for evaluation of her left shoulder:    Discussed development of frozen shoulder and pathoanatomy. Informed the patient it is more common in females, age 44-60s, and associated with patients with endocrinopathy such as DM and thyroid disease. In cases associated with diabetes, disease is typically more aggressive, and more refractory to both conservative and surgical treatment. Can also be associated with cervical disc disease. Informed the patient it can be a long process, and to not expect a quick resolution. Clinical stages of pain, stiffness, and thawing can extend over a year. Majority of patients respond favorably to conservative treatment, however, there is a subset of refractory patients who end up requiring surgical management. Avoid aggravating activities and positions. Avoid heavy lifting, avoid lifting away from body, avoid lifting overhead. Keep most activities between chest and waist level. If you have to lift, keep arms/elbows next to your body/torso. Sleep with arms/shoulder in adducted position.       Inflammation/pain management (ice, NSAIDs, intraarticular corticosteroid injections) to allow meaningful participation in physical therapy and home stretching program; PT referral placed and HEP printed out. Sometimes multiple injections are required to break the inflammation. Risks and benefits of a steroid injection were discussed with the patient, including the possibility of adverse local site reactions such as dermal atrophy and skin discoloration. Although rare, an infection is possible and may necessitate surgical treatment if it occurs in a joint or develops into an abscess. Finally, a rise in blood sugar levels is anticipated, particularly in diabetics. Diabetic patients were instructed to monitor their blood glucose levels after the injection and to adjust their insulin regimen as appropriate. The patient elected to proceed, and after verbal consent was obtained and drug allergies were reviewed, the injection site was prepped with alcohol and ChloraPrep. 40mg of Kenalog mixed with lidocaine and marcaine (no epinephrine) was injected into the left shoulder (intra-articular). There were no immediate complications after the procedure. The patient was advised to ice the area intermittently over the next 24-48 hours until the corticosteroid becomes effective. Follow-up in 6-8 weeks to check progress.     Daniella Gómez MD

## 2022-06-08 NOTE — LETTER
[facility]  [address]  [address]  Phone: [phone]  Fax: [phone]           Caty Delgado MD      June 8, 2022     Patient: Shiela Saini   MR Number: [id number]   YOB: 1950   Date of Visit: 6/8/2022       Dear Dr. Julius Tena: Thank you for referring Shiela Saini to me for evaluation/treatment. Below are the relevant portions of my assessment and plan of care. If you have questions, please do not hesitate to call me. I look forward to following Mariah Solomon along with you.     Sincerely,        Caty Delgado MD    CC providers:  Piter Ortiz MD  [address] [place], [place] [address]  [place] [place] [id number]

## 2022-06-30 ENCOUNTER — HOSPITAL ENCOUNTER (OUTPATIENT)
Dept: PHYSICAL THERAPY | Age: 72
Setting detail: THERAPIES SERIES
Discharge: HOME | End: 2022-06-30
Payer: MEDICARE

## 2022-06-30 PROCEDURE — 97161 PT EVAL LOW COMPLEX 20 MIN: CPT

## 2022-06-30 PROCEDURE — 97110 THERAPEUTIC EXERCISES: CPT

## 2022-06-30 PROCEDURE — 97530 THERAPEUTIC ACTIVITIES: CPT

## 2022-06-30 NOTE — FLOWSHEET NOTE
[address] [place] [address]  Phone: [phone]   Fax: [phone]    Physical Therapy Treatment Note/ Progress Report:     Date:  2022    Patient Name:  Mickie Max    :  1950  MRN: [id number]  Restrictions/Precautions:    Medical/Treatment Diagnosis Information: M75.02 (ICD-10-CM) - Adhesive capsulitis of left shoulder  · Treatment Diagnosis: Decreased L Shld ROM and strength, impaired functional mobility      Insurance/Certification information:    [employer] (BMN), Auth required $40 co-pay   Physician Information:  Fabi Calvillo MD    Plan of care signed (Y/N): []  Yes []  No     Date of Patient follow up with Physician: 22   Progress Report: []  Yes  [x]  No     Date Range for reporting period:  Beginnin22  Ending:      Progress report due (10 Rx/or 30 days whichever is less): visit #10 or 16    Recertification due (POC duration/ or 90 days whichever is less): visit #16 or 22 (8 weeks)     Visit # Insurance Allowable Auth required?  Date Range   1 BMN  [x]  Yes  []  No 22-**         Latex Allergy:  [x]NO      []YES  Preferred Language for Healthcare:   [x]English       []other:    Functional Scale:           Date assessed:  FOTO physical FS primary measure score = 60; risk adjusted = 45  22     Pain level:  2/10     SUBJECTIVE:  See eval    OBJECTIVE: See eval   Observation:    Test measurements:      RESTRICTIONS/PRECAUTIONS:     Exercises/Interventions:   Therapeutic Exercise (52102) Resistance / level Sets / Seconds  Reps Notes/Cues   Supine ER w/ ranger   5\" 10    Table slides   10\" 10     Cross body adduction stretch   10\" 5     IR towel across back   10\" 10                                                      Therapeutic Activities (94642)       Pt education  8'  On diagnosis, prognosis, expectations, frequency of PT, HEP                               Neuromuscular Re-ed (15219) Manual Intervention (94576)       Shld /GH Mobs       Post Cap mobs       Thoracic/Rib manipulation       CT MT/Mobs       PROM MT                  Modalities:     Pt. Education:  -pt educated on diagnosis, prognosis and expectations for rehab  -all pt questions were answered    Home Exercise Program:  HEP instruction: Written HEP instructions provided and reviewed     Access Code: [email or web address]  URL: [email or web address]  Date: 06/30/2022  Prepared by: Bk Silveira     Exercises  Seated Shoulder Flexion Towel Slide at Table Top - 1-2 x daily - 7 x weekly - 10 reps - 10s hold  Supine Shoulder External Rotation with Dowel - 1-2 x daily - 7 x weekly - 10 reps - 10s hold  Supine Cross Body Shoulder Stretch - 1-2 x daily - 7 x weekly - 5 reps - 20s hold  Standing Shoulder Posterior Capsule Stretch - 1-2 x daily - 7 x weekly - 5 reps - 20s hold  Standing Shoulder Internal Rotation AAROM Behind Back with Towel - 1-2 x daily - 7 x weekly - 5 reps - 20s hold    Therapeutic Exercise and NMR EXR  [x] (35813) Provided verbal/tactile cueing for activities related to strengthening, flexibility, endurance, ROM  for improvements in scapular, scapulothoracic and UE control with self care, reaching, carrying, lifting, house/yardwork, driving/computer work.    [] (79885) Provided verbal/tactile cueing for activities related to improving balance, coordination, kinesthetic sense, posture, motor skill, proprioception  to assist with  scapular, scapulothoracic and UE control with self care, reaching, carrying, lifting, house/yardwork, driving/computer work.  [] (26567) Therapist is in constant attendance of 2 or more patients providing skilled therapy interventions, but not providing any significant amount of measurable one-on-one time to either patient, for improvements in cervical, scapular, scapulothoracic and UE control with self care, reaching, carrying, lifting, house/yardwork, driving, computer work.      Therapeutic Activities:    [x] (99261 or 70742) Provided verbal/tactile cueing for activities related to improving balance, coordination, kinesthetic sense, posture, motor skill, proprioception and motor activation to allow for proper function of scapular, scapulothoracic and UE control with self care, carrying, lifting, driving/computer work.      Home Exercise Program:    [x] (33161) Reviewed/Progressed HEP activities related to strengthening, flexibility, endurance, ROM of scapular, scapulothoracic and UE control with self care, reaching, carrying, lifting, house/yardwork, driving/computer work  [] (14931) Reviewed/Progressed HEP activities related to improving balance, coordination, kinesthetic sense, posture, motor skill, proprioception of scapular, scapulothoracic and UE control with self care, reaching, carrying, lifting, house/yardwork, driving/computer work      Manual Treatments:  PROM / STM / Oscillations-Mobs:  G-I, II, III, IV (PA's, Inf., Post.)  [] (78728) Provided manual therapy to mobilize soft tissue/joints of cervical/CT, scapular GHJ and UE for the purpose of modulating pain, promoting relaxation,  increasing ROM, reducing/eliminating soft tissue swelling/inflammation/restriction, improving soft tissue extensibility and allowing for proper ROM for normal function with self care, reaching, carrying, lifting, house/yardwork, driving/computer work      Charges:  Timed Code Treatment Minutes: 32   Total Treatment Minutes: 46       [x] EVAL - LOW (57369)   [] EVAL - MOD (53334)  [] EVAL - HIGH (77456)  [] RE-EVAL (71090)  [x] DL(67151) x   1    [] Ionto  [] NMR (97267) x       [] Vaso  [] Manual (85991) x       [] Ultrasound  [x] TA x  1      [] Mech Traction (47322)  [] Aquatic Therapy x     [] ES (un) (43661):   [] Home Management Training x  [] ES(attended) (90942)   [] Dry Needling 1-2 muscles (21428):  [] Dry Needling 3+ muscles (825891  [] Group:      [] Other:                      GOALS:  Patient stated goal: regain motion   []? Progressing: []? Met: []? Not Met: []? Adjusted     Therapist goals for Patient:   Short Term Goals: To be achieved in: 2 weeks  1. Independent in HEP and progression per patient tolerance, in order to prevent re-injury. []? Progressing: []? Met: []? Not Met: []? Adjusted  2. Patient will have a decrease in pain to facilitate improvement in movement, function, and ADLs as indicated by Functional Deficits. []? Progressing: []? Met: []? Not Met: []? Adjusted     Long Term Goals: To be achieved in: 8 weeks  1. FOTO score of at least 71 to assist with reaching prior level of function. []? Progressing: []? Met: []? Not Met: []? Adjusted  2. Patient will demonstrate increased L shld Flex/abd AROM to >150 to allow for proper joint functioning as indicated by patients Functional Deficits. []? Progressing: []? Met: []? Not Met: []? Adjusted  3. Patient will demonstrate an increase in gross L shld Strength to 4+/5 to allow for proper functional mobility as indicated by patients Functional Deficits. []? Progressing: []? Met: []? Not Met: []? Adjusted  4. Patient will return to functional activities including lifting a 5# object overhead without increased symptoms or restriction. []? Progressing: []? Met: []? Not Met: []? Adjusted  5. Patient will return to participating in self-care activities including: getting dressed and combing hair without increased pain or symptoms to return to PLOF. []? Progressing: []? Met: []? Not Met: []? Adjusted         Overall Progression Towards Functional goals/ Treatment Progress Update:  [] Patient is progressing as expected towards functional goals listed. [] Progression is slowed due to complexities/Impairments listed. [] Progression has been slowed due to co-morbidities.   [x] Plan just implemented, too soon to assess goals progression <30days   [] Goals require adjustment due to lack of progress  [] Patient is not progressing as expected and requires additional follow up with physician  [] Other    Persisting Functional Limitations/Impairments:  []Sitting []Standing   []Transfers  [x]Sleeping   [x]Reaching [x]Lifting   [x]ADLs []Housework  []Driving [x]Job related tasks  []Sports/Recreation []Other:    ASSESSMENT:  See eval    Treatment/Activity Tolerance:  [] Pt able to complete treatment [] Patient limited by fatique  [x] Patient limited by pain  [] Patient limited by other medical complications  [] Other:     Prognosis: [] Good [x] Fair  [] Poor    Patient Requires Follow-up: [x] Yes  [] No    Return to Play:    [x]  N/A    PLAN: See eval. PT 1-2x / week for 8 weeks. [] Continue per plan of care [] Alter current plan (see comments)  [x] Plan of care initiated [] Hold pending MD visit [] Discharge    Electronically signed by: Isaiah Walsh, PT, DPT, OMT-C    Therapist was present, directed the patient's care, made skilled judgement, and was responsible for assessment and treatment of the patient. Pam Agustin, SPT     Note: If patient does not return for scheduled/ recommended follow up visits, this note will serve as a discharge from care along with most recent update on progress.

## 2022-06-30 NOTE — PLAN OF CARE
[address] [address], [address] [place], [place] [address]  Phone: [phone]   Fax: [phone]                                                     Physical Therapy Certification    Dear Demetria Carrasquillo MD  ,    We had the pleasure of evaluating the following patient for physical therapy services at [address] [place] [address]. A summary of our findings can be found in the initial assessment below. This includes our plan of care. If you have any questions or concerns regarding these findings, please do not hesitate to contact me at the office phone number checked above. Thank you for the referral.       Physician Signature:_______________________________Date:__________________  By signing above (or electronic signature), therapists plan is approved by physician      Patient: Sanjuanita Parra   : 1950   MRN: [id number]  Referring Physician: Demetria Carrasquillo MD        Evaluation Date: 2022      Medical Diagnosis Information:M75.02 (ICD-10-CM) - Adhesive capsulitis of left shoulder  Treatment Diagnosis: Decreased L Shld ROM and strength, impaired functional mobility                               Insurance information: [employer] (BMN), Auth required $40 co-pay     Precautions/ Contra-indications: NA   Latex Allergy:  [x]NO      []YES  Preferred Language for Healthcare:   [x]English       []Other:    C-SSRS Triggered by Intake questionnaire (Past 2 wk assessment ):   [x] No, Questionnaire did not trigger screening.   [] Yes, Patient intake triggered C-SSRS Screening     [] Completed, no further action required. [] Completed, PCP notified via Epic    SUBJECTIVE: Patient stated complaint: L shld pain beginning around 3 months ago. Pt said that she started to notice pain in her shoulder and was limited in reaching motions.  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 Pt said that she went to the MD recently and was given a cortisone shot into her L shld and said that she has began to see improvements in her motion, but they are still very painful. States that she has trouble getting dressed and reaching behind her back to clasp her bra. Pt says that she has her  help her when she needs for various ADLs including cooking, cleaning, getting dressed, and doing her hair. Pt states that she has had discomfort sleeping as well. Relevant Medical History: CKD, Cancer   Functional Outcome: FOTO physical FS primary measure score = 60; Risk adjusted = 45    Pain Scale: 2/10 (7/10)   Easing factors: States that nothing helps the pain. Provocative factors: Reaching overhead    Type: [x]Constant   []Intermittent  []Radiating []Localized []other:     Numbness/Tingling: States that she will occasionally get tingling that goes down into her elbow. Occupation/School: Formerly worked at [employer] until it closed; now unemployed. Living Status/Prior Level of Function: Prior to this injury / incident, pt was independent with ADLs and IADLs, states that she requires help at home w/ ADLs from .         OBJECTIVE:   Hand dominance: R    Palpation: No tenderness to palpation     Functional Mobility/Transfers: mod I     Posture: Stands w/ abducted and depressed scapula    Bandages/Dressings/Incisions: NA       Dermatomes Normal Abnormal Comments   Top of head (C1) X     Posterior occipital region (C2) X     Side of neck (C3) X     Top of shoulder (C4) X     Lateral deltoid (C5) X     Tip of thumb (C6) X     Distal middle finger (C7) X     Distal fifth finger (C8) X     Medial forearm (T1) X         Reflexes Normal Abnormal Comments   C5-6 Biceps      C5-6 Brachioradialis      C7-8 Triceps      Coopers      Clonus          Cervical AROM screen: [x] WFL  [] abnormal:     PROM AROM    L R L R   Cervical Flexion        Cervical Extension        Cervical Rotation Cervical Side-bend       Shoulder Flexion  118  110 ~150   Shoulder Abduction  103  100 ~150   Shoulder External Rotation  55  44    Shoulder Internal Rotation  53  Ischial tuberosity    Elbow Flexion        Elbow Extension        Pronation        Supination        Wrist Flexion        Wrist Extension        Radial Deviation        Ulnar Deviation            Strength (0-5) Left Right    Shoulder Shrug (C4) NT  5    Shoulder Flex  4+   Shoulder Abd (C5)  4   Shoulder ER  4+   Shoulder IR  5   Biceps (C6) 4 4+   Triceps (C7) 4+ 5       Joint mobility:    []Normal    [x]Hypo: tight inf and posterior capsule   []Hyper      Orthopaedic Special Tests Positive  Negative  NT Comments    Shoulder        Neer  X      Bentley-Gold X      Empty Can  X     Drop Arm  X     Clarendon's  X     Speeds   X     Sulcus        Apprehension (Anterior / Posterior)       Load and Shift       Lift off test  X                                              [x] Patient history, allergies, meds reviewed. Medical chart reviewed. See intake form. Review Of Systems (ROS):  [x]Performed Review of systems (Integumentary, CardioPulmonary, Neurological) by intake and observation. Intake form has been scanned into medical record. Patient has been instructed to contact their primary care physician regarding ROS issues if not already being addressed at this time.       Co-morbidities/Complexities (which will affect course of rehabilitation):   []None        []Hx of COVID   Arthritic conditions   []Rheumatoid arthritis (M05.9)  []Osteoarthritis (M19.91)  []Gout   Cardiovascular conditions   [x]Hypertension (I10)  []Hyperlipidemia (E78.5)  []Angina pectoris (I20)  []Atherosclerosis (I70)  []Pacemaker  []Hx of CABG/stent/  cardiac surgeries   Musculoskeletal conditions   []Disc pathology   []Congenital spine pathologies   []Osteoporosis (M81.8)  []Osteopenia (M85.8)  []Scoliosis       Endocrine conditions   []Hypothyroid (E03.9)  []Hyperthyroid Gastrointestinal conditions   []Constipation (Q09.82)   Metabolic conditions   []Morbid obesity (E66.01)  []Diabetes type 1(E10.65) or 2 (E11.65)   []Neuropathy (G60.9)     Cardio/Pulmonary conditions   [x]Asthma (J45)  []Coughing   []COPD (J44.9)  []CHF  []A-fib   Psychological Disorders  []Anxiety (F41.9)  []Depression (F32.9)   []Other:   Developmental Disorders  []Autism (F84.0)  []CP (G80)  []Down Syndrome (Q90.9)  []Developmental delay     Neurological conditions  []Prior Stroke (I69.30)  []Parkinson's (G20)  []Encephalopathy (G93.40)  []MS (G35)  []Post-polio (G14)  []SCI  []TBI  []ALS Other conditions  []Fibromyalgia (M79.7)  []Vertigo  []Syncope  []Kidney Failure  [x]Cancer      []currently undergoing                treatment  []Pregnancy  []Incontinence   Prior surgeries  []involved limb  []previous spinal surgery  [] section birth  []hysterectomy  []bowel / bladder surgery  []other relevant surgeries   []Other:              Barriers to/and or personal factors that will affect rehab potential:              [x]Age  []Sex    []Smoker              [x]Motivation/Lack of Motivation                        [x]Co-Morbidities              [x]Cognitive Function, education/learning barriers              [x]Environmental, home barriers              [x]profession/work barriers  []past PT/medical experience  []other:  Justification: Pt will have fair rehab potential due to increased age and co-morbidities, also language barrier and lack of motivation to exercise. Falls Risk Assessment (30 days):   [x] Falls Risk assessed and no intervention required. [] Falls Risk assessed and Patient requires intervention due to being higher risk   TUG score (>12s at risk):     [] Falls education provided, including       ASSESSMENT: Pt presents to PT w/ decreased L shld ROM and strength. Pt is limited in all planes of motion actively and passively.  Pt compensates with scapular elevation, shoulder shrugging, and leaning to the contralateral side. Pt signs and symptoms consistent with adhesive capsulitis capsular pattern Abd>ER>Flex/IR. Pt also presents w/ signs of ant impingement secondary to capsular tightness and muscular weakness. Pt capsule is hypomobile inferiorly and posteriorly. Pt was educated on etiology of adhesive capsulitis, prognosis, and capsular pattern. Pt was given stretches to increase PROM and address capsular tightness. Skilled PT services required to regain ROM, strength, and endurance of L shoulder. Functional Impairments   []Noted spinal or UE joint hypomobility   []Noted spinal or UE joint hypermobility   [x]Decreased UE functional ROM   [x]Decreased UE functional strength   []Abnormal reflexes/sensation/myotomal/dermatomal deficits   [x]Decreased RC/scapular/core strength and neuromuscular control   []other:      Functional Activity Limitations (from functional questionnaire and intake)   [x]Reduced ability to tolerate prolonged functional positions   []Reduced ability or difficulty with changes of positions or transfers between positions   []Reduced ability to maintain good posture and demonstrate good body mechanics with sitting, bending, and lifting   [] Reduced ability or tolerance with driving and/or computer work   [x]Reduced ability to sleep   [x]Reduced ability to perform lifting, reaching, carrying tasks   [x]Reduced ability to tolerate impact through UE   [x]Reduced ability to reach behind back   []Reduced ability to  or hold objects   [x]Reduced ability to throw or toss an object   []other:    Participation Restrictions   [x]Reduced participation in self care activities   [x]Reduced participation in home management activities   []Reduced participation in work activities   [x]Reduced participation in social activities. []Reduced participation in sport/recreation activities.     Classification:   []Signs/symptoms consistent with post-surgical status including decreased ROM, strength and function. []Signs/symptoms consistent with joint sprain/strain   [x]Signs/symptoms consistent with shoulder impingement   [x]Signs/symptoms consistent with shoulder/elbow/wrist tendinopathy   []Signs/symptoms consistent with Rotator cuff tear   []Signs/symptoms consistent with labral tear   []Signs/symptoms consistent with postural dysfunction    []Signs/symptoms consistent with Glenohumeral IR Deficit - <45 degrees   []Signs/symptoms consistent with facet dysfunction of cervical/thoracic spine    []Signs/symptoms consistent with pathology which may benefit from Dry needling     [x]other: Adhesive capsulitis     Prognosis/Rehab Potential:      []Excellent   []Good    [x]Fair   []Poor    Tolerance of evaluation/treatment:    []Excellent   [x]Good    []Fair   []Poor    Physical Therapy Evaluation Complexity Justification  [x] A history of present problem with:  [] no personal factors and/or comorbidities that impact the plan of care;  [x]1-2 personal factors and/or comorbidities that impact the plan of care  []3 personal factors and/or comorbidities that impact the plan of care  [x] An examination of body systems using standardized tests and measures addressing any of the following: body structures and functions (impairments), activity limitations, and/or participation restrictions;:  [] a total of 1-2 or more elements   [] a total of 3 or more elements   [x] a total of 4 or more elements   [x] A clinical presentation with:  [x] stable and/or uncomplicated characteristics   [] evolving clinical presentation with changing characteristics  [] unstable and unpredictable characteristics;   [x] Clinical decision making of [x] low, [] moderate, [] high complexity using standardized patient assessment instrument and/or measurable assessment of functional outcome.     [x] EVAL (LOW) 33737 (typically 15 minutes face-to-face)  [] EVAL (MOD) 75736 (typically 30 minutes face-to-face)  [] EVAL (HIGH) 16502 (typically 45 minutes face-to-face)  [] RE-EVAL     PLAN:  Frequency/Duration:  1-2 days per week for 8 Weeks:  INTERVENTIONS:  [x] Therapeutic exercise including: strength training, ROM, for Upper extremity and core   [x]  NMR activation and proprioception for UE, scap and Core   [x] Manual therapy as indicated for shoulder, scapula and spine to include: Dry Needling/IASTM, STM, PROM, Gr I-IV mobilizations, manipulation. [x] Modalities as needed that may include: thermal agents, E-stim, Biofeedback, US, iontophoresis as indicated  [x] Patient education on joint protection, postural re-education, activity modification, progression of HEP. HEP instruction: Written HEP instructions provided and reviewed     Access Code: [email or web address]  URL: [email or web address]  Date: 06/30/2022  Prepared by: Freddy Flax    Exercises  Seated Shoulder Flexion Towel Slide at Table Top - 1-2 x daily - 7 x weekly - 10 reps - 10s hold  Supine Shoulder External Rotation with Dowel - 1-2 x daily - 7 x weekly - 10 reps - 10s hold  Supine Cross Body Shoulder Stretch - 1-2 x daily - 7 x weekly - 5 reps - 20s hold  Standing Shoulder Posterior Capsule Stretch - 1-2 x daily - 7 x weekly - 5 reps - 20s hold  Standing Shoulder Internal Rotation AAROM Behind Back with Towel - 1-2 x daily - 7 x weekly - 5 reps - 20s hold      GOALS:  Patient stated goal: regain motion   [] Progressing: [] Met: [] Not Met: [] Adjusted    Therapist goals for Patient:   Short Term Goals: To be achieved in: 2 weeks  1. Independent in HEP and progression per patient tolerance, in order to prevent re-injury. [] Progressing: [] Met: [] Not Met: [] Adjusted  2. Patient will have a decrease in pain to facilitate improvement in movement, function, and ADLs as indicated by Functional Deficits. [] Progressing: [] Met: [] Not Met: [] Adjusted    Long Term Goals: To be achieved in: 8 weeks  1. FOTO score of at least 71 to assist with reaching prior level of function.   [] Progressing: [] Met: [] Not Met: [] Adjusted  2. Patient will demonstrate increased L shld Flex/abd AROM to >150 to allow for proper joint functioning as indicated by patients Functional Deficits. [] Progressing: [] Met: [] Not Met: [] Adjusted  3. Patient will demonstrate an increase in gross L shld Strength to 4+/5 to allow for proper functional mobility as indicated by patients Functional Deficits. [] Progressing: [] Met: [] Not Met: [] Adjusted  4. Patient will return to functional activities including lifting a 5# object overhead without increased symptoms or restriction. [] Progressing: [] Met: [] Not Met: [] Adjusted  5. Patient will return to participating in self-care activities including: getting dressed and combing hair without increased pain or symptoms to return to PLOF. [] Progressing: [] Met: [] Not Met: [] Adjusted     Electronically signed by:  Hyacinth Rousseau, PT, DPT, OMT-C     Therapist was present, directed the patient's care, made skilled judgement, and was responsible for assessment and treatment of the patient.       Tavares So, SPT

## 2022-07-01 ENCOUNTER — HOSPITAL ENCOUNTER (OUTPATIENT)
Age: 72
Discharge: HOME | End: 2022-07-01
Payer: MEDICARE

## 2022-07-01 DIAGNOSIS — N18.4 CKD (CHRONIC KIDNEY DISEASE) STAGE 4, GFR 15-29 ML/MIN (HCC): ICD-10-CM

## 2022-07-01 LAB
ALBUMIN SERPL-MCNC: 4.1 G/DL (ref 3.4–5)
ANION GAP SERPL CALCULATED.3IONS-SCNC: 14 MMOL/L (ref 3–16)
BUN BLDV-MCNC: 30 MG/DL (ref 7–20)
CALCIUM SERPL-MCNC: 9.2 MG/DL (ref 8.3–10.6)
CHLORIDE BLD-SCNC: 105 MMOL/L (ref 99–110)
CO2: 21 MMOL/L (ref 21–32)
CREAT SERPL-MCNC: 2.7 MG/DL (ref 0.6–1.2)
CREATININE URINE: 90.1 MG/DL (ref 28–259)
GFR AFRICAN AMERICAN: 21
GFR NON-AFRICAN AMERICAN: 17
GLUCOSE BLD-MCNC: 96 MG/DL (ref 70–99)
HCT VFR BLD CALC: 37 % (ref 36–48)
HEMOGLOBIN: 12.3 G/DL (ref 12–16)
MCH RBC QN AUTO: 29 PG (ref 26–34)
MCHC RBC AUTO-ENTMCNC: 33.4 G/DL (ref 31–36)
MCV RBC AUTO: 87 FL (ref 80–100)
PDW BLD-RTO: 13.8 % (ref 12.4–15.4)
PHOSPHORUS: 3.7 MG/DL (ref 2.5–4.9)
PLATELET # BLD: 132 K/UL (ref 135–450)
PMV BLD AUTO: 8.5 FL (ref 5–10.5)
POTASSIUM SERPL-SCNC: 4.2 MMOL/L (ref 3.5–5.1)
PROTEIN PROTEIN: 61 MG/DL
PROTEIN/CREAT RATIO: 0.7 MG/DL
RBC # BLD: 4.25 M/UL (ref 4–5.2)
SODIUM BLD-SCNC: 140 MMOL/L (ref 136–145)
WBC # BLD: 6.9 K/UL (ref 4–11)

## 2022-07-01 PROCEDURE — 82570 ASSAY OF URINE CREATININE: CPT

## 2022-07-01 PROCEDURE — 36415 COLL VENOUS BLD VENIPUNCTURE: CPT

## 2022-07-01 PROCEDURE — 84156 ASSAY OF PROTEIN URINE: CPT

## 2022-07-01 PROCEDURE — 80069 RENAL FUNCTION PANEL: CPT

## 2022-07-01 PROCEDURE — 85027 COMPLETE CBC AUTOMATED: CPT

## 2022-07-14 ENCOUNTER — OFFICE VISIT (OUTPATIENT)
Dept: FAMILY MEDICINE CLINIC | Age: 72
End: 2022-07-14
Payer: MEDICARE

## 2022-07-14 VITALS
TEMPERATURE: 97.3 F | OXYGEN SATURATION: 98 % | DIASTOLIC BLOOD PRESSURE: 80 MMHG | BODY MASS INDEX: 30.94 KG/M2 | WEIGHT: 153.2 LBS | SYSTOLIC BLOOD PRESSURE: 122 MMHG | HEART RATE: 54 BPM

## 2022-07-14 DIAGNOSIS — J30.2 SEASONAL ALLERGIES: ICD-10-CM

## 2022-07-14 DIAGNOSIS — E78.2 MIXED HYPERLIPIDEMIA: ICD-10-CM

## 2022-07-14 DIAGNOSIS — F51.01 PRIMARY INSOMNIA: Primary | ICD-10-CM

## 2022-07-14 PROCEDURE — G8400 PT W/DXA NO RESULTS DOC: HCPCS | Performed by: NURSE PRACTITIONER

## 2022-07-14 PROCEDURE — 1036F TOBACCO NON-USER: CPT | Performed by: NURSE PRACTITIONER

## 2022-07-14 PROCEDURE — 3017F COLORECTAL CA SCREEN DOC REV: CPT | Performed by: NURSE PRACTITIONER

## 2022-07-14 PROCEDURE — 1123F ACP DISCUSS/DSCN MKR DOCD: CPT | Performed by: NURSE PRACTITIONER

## 2022-07-14 PROCEDURE — G8417 CALC BMI ABV UP PARAM F/U: HCPCS | Performed by: NURSE PRACTITIONER

## 2022-07-14 PROCEDURE — 1090F PRES/ABSN URINE INCON ASSESS: CPT | Performed by: NURSE PRACTITIONER

## 2022-07-14 PROCEDURE — 99213 OFFICE O/P EST LOW 20 MIN: CPT | Performed by: NURSE PRACTITIONER

## 2022-07-14 PROCEDURE — G8427 DOCREV CUR MEDS BY ELIG CLIN: HCPCS | Performed by: NURSE PRACTITIONER

## 2022-07-14 RX ORDER — MONTELUKAST SODIUM 10 MG/1
10 TABLET ORAL NIGHTLY
Qty: 90 TABLET | Refills: 0 | Status: SHIPPED | OUTPATIENT
Start: 2022-07-14 | End: 2022-10-13 | Stop reason: SDUPTHER

## 2022-07-14 RX ORDER — SIMVASTATIN 20 MG
TABLET ORAL
Qty: 90 TABLET | Refills: 0 | Status: SHIPPED | OUTPATIENT
Start: 2022-07-14 | End: 2022-10-13 | Stop reason: SDUPTHER

## 2022-07-14 RX ORDER — HYDROXYZINE PAMOATE 25 MG/1
25 CAPSULE ORAL 3 TIMES DAILY PRN
Qty: 60 CAPSULE | Refills: 0 | Status: SHIPPED | OUTPATIENT
Start: 2022-07-14 | End: 2022-08-13

## 2022-07-14 ASSESSMENT — ENCOUNTER SYMPTOMS
SHORTNESS OF BREATH: 0
COUGH: 0
VOMITING: 0
NAUSEA: 0
DIARRHEA: 0

## 2022-07-14 ASSESSMENT — PATIENT HEALTH QUESTIONNAIRE - PHQ9
SUM OF ALL RESPONSES TO PHQ QUESTIONS 1-9: 0
SUM OF ALL RESPONSES TO PHQ QUESTIONS 1-9: 0
2. FEELING DOWN, DEPRESSED OR HOPELESS: 0
SUM OF ALL RESPONSES TO PHQ QUESTIONS 1-9: 0
SUM OF ALL RESPONSES TO PHQ QUESTIONS 1-9: 0
SUM OF ALL RESPONSES TO PHQ9 QUESTIONS 1 & 2: 0
1. LITTLE INTEREST OR PLEASURE IN DOING THINGS: 0

## 2022-07-14 NOTE — PROGRESS NOTES
Isabel Faustin  : 1950  Encounter date: 2022    This is a 66 y.o. female who presents with  Chief Complaint   Patient presents with    Insomnia     5 days having trouble sleeping- sleeping only 3-4 hours once awake can't go back to sleep. History of present illness:    HPI   1. Presents to clinic today with concerns for difficulty sleeping that started approximately 5 days prior - this has been an intermittent for the past year, but recent 5 days have been much worse. Reports struggling with staying asleep - can go to sleep, but having trouble staying asleep - will go to sleep around 10pm and wakes up around 1am and unable to fall back asleep. Does drink caffeine, but limits to just in the morning. Has tried Melatonin in the past - about a year ago and it was not helpful. No Known Allergies  Current Outpatient Medications   Medication Sig Dispense Refill    montelukast (SINGULAIR) 10 MG tablet Take 1 tablet by mouth nightly 90 tablet 0    simvastatin (ZOCOR) 20 MG tablet TAKE 1 TABLET BY MOUTH AT BEDTIME 90 tablet 0    hydrOXYzine pamoate (VISTARIL) 25 MG capsule Take 1 capsule by mouth 3 times daily as needed (insomnia) 60 capsule 0    losartan (COZAAR) 25 MG tablet Take 1 tablet by mouth daily 90 tablet 1    albuterol sulfate HFA (VENTOLIN HFA) 108 (90 Base) MCG/ACT inhaler Inhale 2 puffs into the lungs every 6 hours as needed for Wheezing 18 g 1    anastrozole (ARIMIDEX) 1 MG tablet TAKE 1 TABLET BY MOUTH ONCE DAILY       No current facility-administered medications for this visit. Review of Systems   Constitutional: Negative for activity change, appetite change, chills, fatigue and fever. Respiratory: Negative for cough and shortness of breath. Cardiovascular: Negative for chest pain and palpitations. Gastrointestinal: Negative for diarrhea, nausea and vomiting.      Past medical, surgical, family and social history were reviewed and updated with the patient. Objective:    /80 (Site: Right Upper Arm, Position: Sitting, Cuff Size: Medium Adult)   Pulse 54   Temp 97.3 °F (36.3 °C)   Wt 153 lb 3.2 oz (69.5 kg)   SpO2 98%   BMI 30.94 kg/m²   Weight: 153 lb 3.2 oz (69.5 kg)     BP Readings from Last 3 Encounters:   07/14/22 122/80   07/06/22 127/73   06/01/22 130/80     Wt Readings from Last 3 Encounters:   07/14/22 153 lb 3.2 oz (69.5 kg)   07/06/22 157 lb (71.2 kg)   06/08/22 152 lb (68.9 kg)     Physical Exam  Constitutional:       General: She is not in acute distress. Appearance: She is well-developed. HENT:      Head: Normocephalic and atraumatic. Cardiovascular:      Rate and Rhythm: Normal rate and regular rhythm. Heart sounds: Normal heart sounds, S1 normal and S2 normal.   Pulmonary:      Effort: Pulmonary effort is normal. No respiratory distress. Breath sounds: Normal breath sounds. Skin:     General: Skin is warm and dry. Neurological:      Mental Status: She is alert and oriented to person, place, and time. Psychiatric:         Thought Content: Thought content normal.         Judgment: Judgment normal.       Assessment/Plan    1. Primary insomnia  Trial Vistaril. Monitor for side effects. Due for Medicare annual wellness. Follow up in 3 months for routine follow up and medication monitoring.   - hydrOXYzine pamoate (VISTARIL) 25 MG capsule; Take 1 capsule by mouth 3 times daily as needed (insomnia)  Dispense: 60 capsule; Refill: 0    2. Mixed hyperlipidemia  - simvastatin (ZOCOR) 20 MG tablet; TAKE 1 TABLET BY MOUTH AT BEDTIME  Dispense: 90 tablet; Refill: 0    3. Seasonal allergies  - montelukast (SINGULAIR) 10 MG tablet; Take 1 tablet by mouth nightly  Dispense: 90 tablet; Refill: 0     Allegra Section was counseled regarding symptoms of current diagnosis, course and complications of disease if inadequately treated.   Discussed side effects of medications, diagnosis, treatment options, and prognosis along with risks, benefits, complications, and alternatives of treatment including labs, imaging and other studies/treatment targets and goals. She verbalized understanding of instructions and counseling. Return in about 3 months (around 10/14/2022) for Medicare annual wellness, chronic health conditons. Medical decision making of low complexity.

## 2022-07-20 ENCOUNTER — OFFICE VISIT (OUTPATIENT)
Dept: ORTHOPEDIC SURGERY | Age: 72
End: 2022-07-20
Payer: MEDICARE

## 2022-07-20 VITALS — BODY MASS INDEX: 30.84 KG/M2 | WEIGHT: 153 LBS | HEIGHT: 59 IN

## 2022-07-20 DIAGNOSIS — M75.02 ADHESIVE CAPSULITIS OF LEFT SHOULDER: Primary | ICD-10-CM

## 2022-07-20 PROCEDURE — G8417 CALC BMI ABV UP PARAM F/U: HCPCS | Performed by: ORTHOPAEDIC SURGERY

## 2022-07-20 PROCEDURE — 3017F COLORECTAL CA SCREEN DOC REV: CPT | Performed by: ORTHOPAEDIC SURGERY

## 2022-07-20 PROCEDURE — 1036F TOBACCO NON-USER: CPT | Performed by: ORTHOPAEDIC SURGERY

## 2022-07-20 PROCEDURE — G8427 DOCREV CUR MEDS BY ELIG CLIN: HCPCS | Performed by: ORTHOPAEDIC SURGERY

## 2022-07-20 PROCEDURE — 1123F ACP DISCUSS/DSCN MKR DOCD: CPT | Performed by: ORTHOPAEDIC SURGERY

## 2022-07-20 PROCEDURE — 99213 OFFICE O/P EST LOW 20 MIN: CPT | Performed by: ORTHOPAEDIC SURGERY

## 2022-07-20 PROCEDURE — G8400 PT W/DXA NO RESULTS DOC: HCPCS | Performed by: ORTHOPAEDIC SURGERY

## 2022-07-20 PROCEDURE — 1090F PRES/ABSN URINE INCON ASSESS: CPT | Performed by: ORTHOPAEDIC SURGERY

## 2022-07-20 NOTE — PROGRESS NOTES
ORTHOPAEDIC CONSULTATION NOTE    Chief Complaint   Patient presents with    Follow-up     Lt Shoulder       HPI  7/20/22  Patient returns to clinic today for follow-up of her left shoulder  She received an injection at her last appointment and reports slight improvement with that  She has only been to 1 therapy session  She reports some improvement in her range of motion, doing HEP      6/8/22  66 y.o. female RHD seen in consultation at the request of Josette Priest MD for evaluation of left shoulder pain:  Onset 1.5 months ago  Injury/trauma none  Pain is located left lateral shoulder/brachium  No distal radiation  Denies left hand N/T  Worse with activity, raising her left arm  Better with rest, arm at side  Associated with loss of motion  Neck pain = no      Review of Systems  ROS from the Patient History Form dated on 6/8/22  Pertinent positives include weight change, asthma, hypertension, urinary frequency, chronic kidney disease, not on dialysis, breast cancer hx  Rest of 13 point ROS otherwise negative except per HPI, and scanned into the patient's chart under the Media tab. No Known Allergies     Current Outpatient Medications   Medication Sig Dispense Refill    montelukast (SINGULAIR) 10 MG tablet Take 1 tablet by mouth nightly 90 tablet 0    simvastatin (ZOCOR) 20 MG tablet TAKE 1 TABLET BY MOUTH AT BEDTIME 90 tablet 0    hydrOXYzine pamoate (VISTARIL) 25 MG capsule Take 1 capsule by mouth 3 times daily as needed (insomnia) 60 capsule 0    losartan (COZAAR) 25 MG tablet Take 1 tablet by mouth daily 90 tablet 1    albuterol sulfate HFA (VENTOLIN HFA) 108 (90 Base) MCG/ACT inhaler Inhale 2 puffs into the lungs every 6 hours as needed for Wheezing 18 g 1    anastrozole (ARIMIDEX) 1 MG tablet TAKE 1 TABLET BY MOUTH ONCE DAILY       No current facility-administered medications for this visit.        Past Medical History:   Diagnosis Date    Asthma     Breast cancer ([facility] [address]     CKD (chronic kidney disease) stage 4, GFR 15-29 ml/min ([facility])     History of therapeutic radiation     Hyperlipidemia     Hypertension     Kidney stones     Memory loss     Osteopenia         Past Surgical History:   Procedure Laterality Date    BREAST BIOPSY      BREAST LUMPECTOMY Right 8/18/2020    RIGHT BREAST LOCALIZIED PARTIAL MASTECTOMY; SENTINEL LYMPH NODE BIOPSY, BIOZORB, TECHNETIUM NINETY-NINE AND BLUE DYE IN O.R. performed by Cristobal Garcia MD at [address] [place] (CERVIX STATUS UNKNOWN)      LITHOTRIPSY         No family history on file. Social History     Socioeconomic History    Marital status:      Spouse name: Not on file    Number of children: Not on file    Years of education: Not on file    Highest education level: Not on file   Occupational History    Not on file   Tobacco Use    Smoking status: Never    Smokeless tobacco: Never   Vaping Use    Vaping Use: Never used   Substance and Sexual Activity    Alcohol use: No    Drug use: No    Sexual activity: Not on file   Other Topics Concern    Not on file   Social History Narrative    Not on file     Social Determinants of Health     Financial Resource Strain: Not on file   Food Insecurity: Not on file   Transportation Needs: Not on file   Physical Activity: Not on file   Stress: Not on file   Social Connections: Not on file   Intimate Partner Violence: Not on file   Housing Stability: Not on file        Vitals:    07/20/22 1010   Weight: 153 lb (69.4 kg)   Height: 4' 11\" (1.499 m)       Physical Exam  Constitutional - well-groomed, well-nourished, Body mass index is 30.9 kg/m².   Psychiatric - pleasant, normal mood & affect  Cardiovascular - RRR, negative UE peripheral edema, radial pulse 2+  Skin - no rashes, wounds, or lesions seen on exposed skin  Neck - no radicular pain with Spurling's test.  mildly decreased cervical ROM, no TTP of spinous processes, no TTP of paraspinal musculature  Neurological - LUE SILT M/U/R/A/LABC nerve distributions; AIN/PIN/IO intact  Left shoulder:   No obvious deformity/swelling/ecchymosis   No atrophy seen   Range of Motion: Forward flexion/scaption:  98    Abduction:  Obligate FE    External rotation with arm at side:  20    Internal rotation:  L4   Strength:    Abduction:  5/5     External rotation:  5/5    Special tests:    negative belly-press test        Imaging:  Prior images reviewed today  Left shoulder 4 views performed 6/8/22 - glenohumeral articular height is preserved with no evidence of subchondral cystic changes or osteophytes, there are no loose bodies appreciated. Stella's line is preserved. On axillary view, the humeral head is well-centered within the glenoid. The acromioclavicular joint demonstrates no significant degenerative changes. The tuberosities are normal in appearance. Assessment & Plan:  66 y.o. female who presents with    Diagnosis Orders   1. Adhesive capsulitis of left shoulder            No orders of the defined types were placed in this encounter. Only been to 1 PT session  ROM more or less unchanged compared to last visit  Strongly recommended formal PT participation to improve her shoulder ROM and function  Inflammation/pain management (ice, NSAIDs, intraarticular corticosteroid injections) to allow meaningful participation in physical therapy and home stretching program  Shoulder injected last visit, defer today    Follow-up in 2 months to check progress.     Harsha Pickard MD

## 2022-07-25 ENCOUNTER — HOSPITAL ENCOUNTER (OUTPATIENT)
Dept: PHYSICAL THERAPY | Age: 72
Setting detail: THERAPIES SERIES
Discharge: HOME | End: 2022-07-25
Payer: MEDICARE

## 2022-07-25 PROCEDURE — 97112 NEUROMUSCULAR REEDUCATION: CPT | Performed by: PHYSICAL THERAPIST

## 2022-07-25 PROCEDURE — 97110 THERAPEUTIC EXERCISES: CPT | Performed by: PHYSICAL THERAPIST

## 2022-07-25 PROCEDURE — 97140 MANUAL THERAPY 1/> REGIONS: CPT | Performed by: PHYSICAL THERAPIST

## 2022-07-25 NOTE — FLOWSHEET NOTE
[address] [facility]  Phone: [phone]   Fax: [phone]    Physical Therapy Treatment Note/ Progress Report:     Date:  2022    Patient Name:  Sabine Ramos    :  1950  MRN: [id number]  Restrictions/Precautions:    Medical/Treatment Diagnosis Information: M75.02 (ICD-10-CM) - Adhesive capsulitis of left shoulder  Treatment Diagnosis: Decreased L Shld ROM and strength, impaired functional mobility      Insurance/Certification information:    [employer] (BMN), Auth required $40 co-pay   Physician Information:  Cesar Solis MD    Plan of care signed (Y/N): []  Yes []  No     Date of Patient follow up with Physician: 22   Progress Report: []  Yes  [x]  No     Date Range for reporting period:  Beginnin22  Ending:      Progress report due (10 Rx/or 30 days whichever is less): visit #10 or     Recertification due (POC duration/ or 90 days whichever is less): visit #16 or 22 (8 weeks)     Visit # Insurance Allowable Auth required? Date Range   2 BMN  [x]  Yes  []  No 22-**         Latex Allergy:  [x]NO      []YES  Preferred Language for Healthcare:   [x]English       []other:    Functional Scale:           Date assessed:  FOTO physical FS primary measure score = 60; risk adjusted = 45  22     Pain level:  3/10     SUBJECTIVE:  Reports not able to attend PT due to financial issues due to losing job. MD told patient that she needs to attend PT. Overall, shoulder getting better with movement. Reports being able to dress herself but still has the pain in left shoulder. Reports new onset of tingling in head that started last week. No severe headaches or dizziness noted just intermittent tingling L side of head. Reports going to call and schedule appointment with family MD. No tingling noted this visit upon arrival.     OBJECTIVE: See below. Progressed HEP this visit.    Observation:   Test measurements:    OBJECTIVE  Test used 6/30/22 7/25/22   Pain Summary Pain 2-7/20 3/10   Functional questionnaire FOTO 60    Functional Testing       Shoulder flex AROM 110 115/140 PROM   ROM Shoulder abd AROM 100 105    ER PROM 55 65    Strength IR AROM Ischial tuberosity L5             RESTRICTIONS/PRECAUTIONS:     Exercises/Interventions:   Therapeutic Exercise (60051) Resistance / level Sets / Seconds  Reps Notes/Cues   Supine ER w/ ranger   5\" 10    Table slides   10\" 10     Cross body adduction stretch   10\" 5     IR towel across back   10\" 10            Supine ER hands behind head  H30 5x + to HEP   Pulleys flex/scap   15x    Wall slides flex/abd  H10 5 reps each + to HEP   Shld flex with cane  H10 5 reps           Shld ext with TB orange H5 2x10 reps    No money  orange H5 2x10 reps    Therapeutic Activities (58833)       Pt education  8'  On diagnosis, prognosis, expectations, frequency of PT, HEP                               Neuromuscular Re-ed (32535)                                          Manual Intervention ([phone])       Shld /GH Mobs 10'   A-P glides and inferior glides    Post Cap mobs       Thoracic/Rib manipulation       CT MT/Mobs       PROM MT all shoulder motions 5'                 Modalities:     Pt. Education:  -pt educated on diagnosis, prognosis and expectations for rehab  -all pt questions were answered    Home Exercise Program:  HEP instruction: Written HEP instructions provided and reviewed     AAccess Code: [email or web address]  URL: [email or web address]  Date: 07/25/2022  Prepared by: Luis E Drew    Exercises  Seated Shoulder Flexion Towel Slide at Table Top - 1-2 x daily - 7 x weekly - 10 reps - 10s hold  Supine Shoulder External Rotation with Dowel - 1-2 x daily - 7 x weekly - 10 reps - 10s hold  Supine Cross Body Shoulder Stretch - 1-2 x daily - 7 x weekly - 5 reps - 20s hold  Standing Shoulder Posterior Capsule Stretch - 1-2 x daily - 7 x weekly - 5 reps - 20s hold  Standing Shoulder Flexion Wall Walk - 2 x daily - 7 x weekly - 10 reps - 1 sets - 10 hold  Standing Shoulder Abduction Slides at Wall - 2 x daily - 7 x weekly - 10 reps - 1 sets - 10 hold  Standing Shoulder Internal Rotation Stretch with Towel - 2 x daily - 7 x weekly - 5 reps - 1 sets - 30 hold  Supine Chest Stretch with Arms Behind Head - 2 x daily - 7 x weekly - 5 reps - 1 sets - 30 hold    Therapeutic Exercise and NMR EXR  [x] (72066) Provided verbal/tactile cueing for activities related to strengthening, flexibility, endurance, ROM  for improvements in scapular, scapulothoracic and UE control with self care, reaching, carrying, lifting, house/yardwork, driving/computer work.    [] (07611) Provided verbal/tactile cueing for activities related to improving balance, coordination, kinesthetic sense, posture, motor skill, proprioception  to assist with  scapular, scapulothoracic and UE control with self care, reaching, carrying, lifting, house/yardwork, driving/computer work.  [] (19046) Therapist is in constant attendance of 2 or more patients providing skilled therapy interventions, but not providing any significant amount of measurable one-on-one time to either patient, for improvements in cervical, scapular, scapulothoracic and UE control with self care, reaching, carrying, lifting, house/yardwork, driving, computer work. Therapeutic Activities:    [x] (64324 or 19529) Provided verbal/tactile cueing for activities related to improving balance, coordination, kinesthetic sense, posture, motor skill, proprioception and motor activation to allow for proper function of scapular, scapulothoracic and UE control with self care, carrying, lifting, driving/computer work.      Home Exercise Program:    [x] (22449) Reviewed/Progressed HEP activities related to strengthening, flexibility, endurance, ROM of scapular, scapulothoracic and UE control with self care, reaching, carrying, lifting, house/yardwork, driving/computer work  [] (79682) Reviewed/Progressed HEP activities Met: [] Adjusted  2. Patient will demonstrate increased L shld Flex/abd AROM to >150 to allow for proper joint functioning as indicated by patients Functional Deficits. [] Progressing: [] Met: [] Not Met: [] Adjusted  3. Patient will demonstrate an increase in gross L shld Strength to 4+/5 to allow for proper functional mobility as indicated by patients Functional Deficits. [] Progressing: [] Met: [] Not Met: [] Adjusted  4. Patient will return to functional activities including lifting a 5# object overhead without increased symptoms or restriction. [] Progressing: [] Met: [] Not Met: [] Adjusted  5. Patient will return to participating in self-care activities including: getting dressed and combing hair without increased pain or symptoms to return to PLOF. [] Progressing: [] Met: [] Not Met: [] Adjusted         Overall Progression Towards Functional goals/ Treatment Progress Update:  [] Patient is progressing as expected towards functional goals listed. [] Progression is slowed due to complexities/Impairments listed. [] Progression has been slowed due to co-morbidities. [x] Plan just implemented, too soon to assess goals progression <30days   [] Goals require adjustment due to lack of progress  [] Patient is not progressing as expected and requires additional follow up with physician  [] Other    Persisting Functional Limitations/Impairments:  []Sitting []Standing   []Transfers  [x]Sleeping   [x]Reaching [x]Lifting   [x]ADLs []Housework  []Driving [x]Job related tasks  []Sports/Recreation []Other:    ASSESSMENT:  Patient making progress with AROM /PROM L shoulder and reduction in pain L shoulder. Modified and progressed HEP for end range stretching L shoulder. Pain noted at end range PROM . Pt requires skilled intervention to restore ROM, strength, functional endurance and balance in order to perform ADLs without significant symptoms or limitations.      Treatment/Activity Tolerance:  [x] Pt able to complete treatment [] Patient limited by fatique  [] Patient limited by pain  [] Patient limited by other medical complications  [] Other:     Prognosis: [] Good [x] Fair  [] Poor    Patient Requires Follow-up: [x] Yes  [] No    Return to Play:    [x]  N/A    PLAN: See eval. PT 1-2x / week for 8 weeks. [x] Continue per plan of care [] Alter current plan (see comments)  [] Plan of care initiated [] Hold pending MD visit [] Discharge    Electronically signed by: Nancy Pryor, PT, ,OMT-C    Note: If patient does not return for scheduled/ recommended follow up visits, this note will serve as a discharge from care along with most recent update on progress.

## 2022-07-28 ENCOUNTER — HOSPITAL ENCOUNTER (OUTPATIENT)
Dept: PHYSICAL THERAPY | Age: 72
Setting detail: THERAPIES SERIES
Discharge: HOME | End: 2022-07-28
Payer: MEDICARE

## 2022-07-28 NOTE — FLOWSHEET NOTE
Physical Therapy  Cancellation/No-show Note  Patient Name:  Franco Cobian  :  1950   Date:  2022  Cancelled visits to date: 0  No-shows to date: 1    Patient status for today's appointment patient:  []  Cancelled  []  Rescheduled appointment  [x]  No-show      Reason given by patient:  []  Patient ill  []  Conflicting appointment  []  No transportation    []  Conflict with work  []  No reason given  [x]  Other:  thought appointment was tomorrow instead of today. Reminded pt of appts next week on Monday and Friday at 4:30 & 10:45 respectively. Comments:      Phone call information:   [x]  Phone call made today to patient at _ time at number provided: [phone]     [x]  Patient answered, conversation as follows: missed appt today at 7:45 am, next scheduled appt is 2022 4:30 PM    []  Patient did not answer, message left as follows:  []  Phone call not made today  []  Phone call not needed - pt contacted us to cancel and provided reason for cancellation.      Electronically signed by:  Carlos Quintanilla PTA

## 2022-08-01 ENCOUNTER — HOSPITAL ENCOUNTER (OUTPATIENT)
Dept: PHYSICAL THERAPY | Age: 72
Setting detail: THERAPIES SERIES
Discharge: HOME | End: 2022-08-01
Payer: MEDICARE

## 2022-08-01 PROCEDURE — 97110 THERAPEUTIC EXERCISES: CPT

## 2022-08-01 PROCEDURE — 97530 THERAPEUTIC ACTIVITIES: CPT

## 2022-08-01 PROCEDURE — 97140 MANUAL THERAPY 1/> REGIONS: CPT

## 2022-08-01 NOTE — PROGRESS NOTES
[facility]  Phone: [phone]   Fax: [phone]    Physical Therapy Treatment Note/ Progress Report:     Date:  2022    Patient Name:  Misa Rivas    :  1950  MRN: [id number]  Restrictions/Precautions:    Medical/Treatment Diagnosis Information: M75.02 (ICD-10-CM) - Adhesive capsulitis of left shoulder  Treatment Diagnosis: Decreased L Shld ROM and strength, impaired functional mobility      Insurance/Certification information:    [employer] (BMN), Auth required $40 co-pay   Physician Information:  Cody Johnson MD    Plan of care signed (Y/N): [x]  Yes []  No     Date of Patient follow up with Physician: 10/5/22     Progress Report: [x]  Yes  []  No     Date Range for reporting period:  Beginnin22  PN: 22  Ending:      Progress report due (10 Rx/or 30 days whichever is less): visit #13 or 81    Recertification due (POC duration/ or 90 days whichever is less): visit #16 or 22 (8 weeks)     Visit # Insurance Allowable Auth required? Date Range   3 16 visits [x]  Yes  []  No -22         Latex Allergy:  [x]NO      []YES  Preferred Language for Healthcare:   [x]English       []other:    Functional Scale:           Date assessed:  FOTO physical FS primary measure score = 60; risk adjusted = 45  22  FOTO physical FS primary measure score = 66     22     Pain level:  2/10     SUBJECTIVE:  Pt reports that the shoulder has been feeling a little bit better. States that she has been going to the gym to work on [employer]. States that she doesn't struggle much at home. Says that things are getting better, but they are just ok; not perfect. States that she is leaving for the [facility] on  for one month    OBJECTIVE: See below. Progressed HEP this visit.    Observation:    PROM AROM     L R L R   Shoulder Flexion  118   120 ~150   Shoulder Abduction  103   112 ~150   Shoulder External Rotation  55   44     Shoulder Internal Rotation  70   L5            Strength (0-5) Left Right   Shoulder Shrug (C4) 5 5    Shoulder Flex  3+ 4+   Shoulder Abd (C5)  3+ 4   Shoulder ER  4- 4+   Shoulder IR 4  5   Biceps (C6) 4 4+   Triceps (C7) 4+ 5     RESTRICTIONS/PRECAUTIONS:     Exercises/Interventions:   Therapeutic Exercise (53825) Resistance / level Sets / Seconds  Reps Notes/Cues   Supine ER w/ ranger   5\" 10    Table slides   10\" 10     Cross body adduction stretch   10\" 5     IR towel across back   10\" 10            Supine ER hands behind head  H30 5x + to HEP   Pulleys flex/scap      Wall slides flex/abd  H10 5 reps each + to HEP   Shld flex with cane  H10 5 reps           Shld ext with TB orange H5 2x10 reps    No money  orange H5 2x10 reps           Therapeutic Activities (43090) & Neuromuscular Re-ed (05520)       Pt education   8\"   ROM, MMT measurements taken today. Educated pt on pain process with frozen shoulder, continuation of HEP and importance of stretching. TB ER iso  [place]  5\"  15     Serratus punches   1 15                          Manual Intervention (08362)       Shld /GH Mobs 8'   A-P glides and inferior glides    Post Cap mobs       Thoracic/Rib manipulation       CT MT/Mobs       PROM MT all shoulder motions 3'                 Modalities:     Pt. Education:  -pt educated on diagnosis, prognosis and expectations for rehab  -all pt questions were answered    Home Exercise Program:  HEP instruction: Written HEP instructions provided and reviewed     AAccess Code: [email or web address]  URL: [email or web address]  Date: 07/25/2022  Prepared by: Nazario Brannon    Exercises  Seated Shoulder Flexion Towel Slide at Table Top - 1-2 x daily - 7 x weekly - 10 reps - 10s hold  Supine Shoulder External Rotation with Dowel - 1-2 x daily - 7 x weekly - 10 reps - 10s hold  Supine Cross Body Shoulder Stretch - 1-2 x daily - 7 x weekly - 5 reps - 20s hold  Standing Shoulder Posterior Capsule Stretch - 1-2 x daily - 7 x weekly - 5 reps - 20s hold  Standing Shoulder Flexion Wall Walk - 2 x daily - 7 x weekly - 10 reps - 1 sets - 10 hold  Standing Shoulder Abduction Slides at Wall - 2 x daily - 7 x weekly - 10 reps - 1 sets - 10 hold  Standing Shoulder Internal Rotation Stretch with Towel - 2 x daily - 7 x weekly - 5 reps - 1 sets - 30 hold  Supine Chest Stretch with Arms Behind Head - 2 x daily - 7 x weekly - 5 reps - 1 sets - 30 hold    Therapeutic Exercise and NMR EXR  [x] (92396) Provided verbal/tactile cueing for activities related to strengthening, flexibility, endurance, ROM  for improvements in scapular, scapulothoracic and UE control with self care, reaching, carrying, lifting, house/yardwork, driving/computer work.    [] (53494) Provided verbal/tactile cueing for activities related to improving balance, coordination, kinesthetic sense, posture, motor skill, proprioception  to assist with  scapular, scapulothoracic and UE control with self care, reaching, carrying, lifting, house/yardwork, driving/computer work.  [] (79502) Therapist is in constant attendance of 2 or more patients providing skilled therapy interventions, but not providing any significant amount of measurable one-on-one time to either patient, for improvements in cervical, scapular, scapulothoracic and UE control with self care, reaching, carrying, lifting, house/yardwork, driving, computer work. Therapeutic Activities:    [x] (27751 or 56398) Provided verbal/tactile cueing for activities related to improving balance, coordination, kinesthetic sense, posture, motor skill, proprioception and motor activation to allow for proper function of scapular, scapulothoracic and UE control with self care, carrying, lifting, driving/computer work.      Home Exercise Program:    [x] (07607) Reviewed/Progressed HEP activities related to strengthening, flexibility, endurance, ROM of scapular, scapulothoracic and UE control with self care, reaching, carrying, lifting, house/yardwork, driving/computer work  [] (47713) Reviewed/Progressed HEP activities related to improving balance, coordination, kinesthetic sense, posture, motor skill, proprioception of scapular, scapulothoracic and UE control with self care, reaching, carrying, lifting, house/yardwork, driving/computer work      Manual Treatments:  PROM / STM / Oscillations-Mobs:  G-I, II, III, IV (PA's, Inf., Post.)  [x] (77371) Provided manual therapy to mobilize soft tissue/joints of cervical/CT, scapular GHJ and UE for the purpose of modulating pain, promoting relaxation,  increasing ROM, reducing/eliminating soft tissue swelling/inflammation/restriction, improving soft tissue extensibility and allowing for proper ROM for normal function with self care, reaching, carrying, lifting, house/yardwork, driving/computer work      Charges:  Timed Code Treatment Minutes: 39'   Total Treatment Minutes: 45'       [] EVAL - LOW (64790)   [] EVAL - MOD (56887)  [] EVAL - HIGH (97956)  [] RE-EVAL (37433)  [x] OI(10591) x   1    [] Ionto  [] NMR (71178) x       [] Vaso  [x] Manual (31705) x   1    [] Ultrasound  [x] TA x  1      [] Mech Traction (03290)  [] Aquatic Therapy x     [] ES (un) (12387):   [] Home Management Training x  [] ES(attended) (86050)   [] Dry Needling 1-2 muscles (57947):  [] Dry Needling 3+ muscles (413526  [] Group:      [] Other:                      GOALS:  Patient stated goal: regain motion   [x] Progressing: [] Met: [] Not Met: [] Adjusted     Therapist goals for Patient:   Short Term Goals: To be achieved in: 2 weeks  1. Independent in HEP and progression per patient tolerance, in order to prevent re-injury. [] Progressing: [x] Met: [] Not Met: [] Adjusted  2. Patient will have a decrease in pain to facilitate improvement in movement, function, and ADLs as indicated by Functional Deficits. [x] Progressing: [] Met: [] Not Met: [] Adjusted     Long Term Goals: To be achieved in: 8 weeks  1.  FOTO score of at least 71 to assist with reaching prior level of function. [x] Progressing: [] Met: [] Not Met: [] Adjusted  2. Patient will demonstrate increased L shld Flex/abd AROM to >150 to allow for proper joint functioning as indicated by patients Functional Deficits. [x] Progressing: [] Met: [] Not Met: [] Adjusted  3. Patient will demonstrate an increase in gross L shld Strength to 4+/5 to allow for proper functional mobility as indicated by patients Functional Deficits. [x] Progressing: [] Met: [] Not Met: [] Adjusted  4. Patient will return to functional activities including lifting a 5# object overhead without increased symptoms or restriction. [x] Progressing: [] Met: [] Not Met: [] Adjusted  5. Patient will return to participating in self-care activities including: getting dressed and combing hair without increased pain or symptoms to return to PLOF. [x] Progressing: [] Met: [] Not Met: [] Adjusted         Overall Progression Towards Functional goals/ Treatment Progress Update:  [x] Patient is progressing as expected towards functional goals listed. [] Progression is slowed due to complexities/Impairments listed. [] Progression has been slowed due to co-morbidities. [] Plan just implemented, too soon to assess goals progression <30days   [] Goals require adjustment due to lack of progress  [] Patient is not progressing as expected and requires additional follow up with physician  [] Other    Persisting Functional Limitations/Impairments:  []Sitting []Standing   []Transfers  [x]Sleeping   [x]Reaching [x]Lifting   [x]ADLs []Housework  []Driving [x]Job related tasks  []Sports/Recreation []Other:    ASSESSMENT:  PN assessed today. Patient making progress with AROM /PROM L shoulder and reduction in pain L shoulder. Pt compensates with posterior and contralateral leaning when performing AROM. Modified and progressed HEP for end range stretching L shoulder. Pain noted at end range PROM.  Educated pt on frozen shoulder processes, prognosis, and continuation of HEP, especially stretching multiple times a day. Pt requires skilled intervention to restore ROM, strength, functional endurance and balance in order to perform ADLs without significant symptoms or limitations. Treatment/Activity Tolerance:  [x] Pt able to complete treatment [] Patient limited by fatique  [] Patient limited by pain  [] Patient limited by other medical complications  [] Other:     Prognosis: [] Good [x] Fair  [] Poor    Patient Requires Follow-up: [x] Yes  [] No    Return to Play:    [x]  N/A    PLAN: See eval. PT 1-2x / week for 8 weeks. [x] Continue per plan of care [] Alter current plan (see comments)  [] Plan of care initiated [] Hold pending MD visit [] Discharge    Electronically signed by: Ying Gates, PT,DPT ,OMT-C, CSMS     Therapist was present, directed the patient's care, made skilled judgement, and was responsible for assessment and treatment of the patient. Pete River, SPT, CSMS     Note: If patient does not return for scheduled/ recommended follow up visits, this note will serve as a discharge from care along with most recent update on progress.

## 2022-08-03 ENCOUNTER — TELEPHONE (OUTPATIENT)
Dept: FAMILY MEDICINE CLINIC | Age: 72
End: 2022-08-03

## 2022-08-03 DIAGNOSIS — N18.4 CKD (CHRONIC KIDNEY DISEASE) STAGE 4, GFR 15-29 ML/MIN (HCC): ICD-10-CM

## 2022-08-03 RX ORDER — LOSARTAN POTASSIUM 25 MG/1
25 TABLET ORAL DAILY
Qty: 90 TABLET | Refills: 1 | Status: SHIPPED | OUTPATIENT
Start: 2022-08-03 | End: 2022-10-19

## 2022-08-03 NOTE — TELEPHONE ENCOUNTER
----- Message from Paco Meyer sent at 8/3/2022 12:42 PM EDT -----  Subject: Refill Request    QUESTIONS  Name of Medication? losartan (COZAAR) 25 MG tablet  Patient-reported dosage and instructions? once aq day   How many days do you have left? 7  Preferred Pharmacy? [facility] ([address] [place], [place] [address]jo[place])  Pharmacy phone number (if available)? [phone]  ---------------------------------------------------------------------------  --------------  CALL BACK INFO  What is the best way for the office to contact you? OK to leave message on   voicemail  Preferred Call Back Phone Number? [phone]  ---------------------------------------------------------------------------  --------------  SCRIPT ANSWERS  Relationship to Patient?  Self

## 2022-08-03 NOTE — TELEPHONE ENCOUNTER
Medication:   Requested Prescriptions     Pending Prescriptions Disp Refills    losartan (COZAAR) 25 MG tablet 90 tablet 1     Sig: Take 1 tablet by mouth in the morning. Last Filled:      Patient Phone Number: [phone] (home) [phone] (work)    Last appt: 7/14/2022   Next appt: 10/13/2022    Last OARRS: No flowsheet data found.   PDMP Monitoring:    Last PDMP Marylee Liter as Reviewed [facility]):  Review User Review Instant Review Result          Preferred Pharmacy:       [facility] (Now [address] [facility] [facility]) - EnriqueRosita ash [phone] - F [phone]  [address] [place] [address]  Phone: [phone] Fax: [phone]

## 2022-08-05 ENCOUNTER — HOSPITAL ENCOUNTER (OUTPATIENT)
Dept: PHYSICAL THERAPY | Age: 72
Setting detail: THERAPIES SERIES
End: 2022-08-05
Payer: MEDICARE

## 2022-10-13 ENCOUNTER — OFFICE VISIT (OUTPATIENT)
Dept: FAMILY MEDICINE CLINIC | Age: 72
End: 2022-10-13
Payer: MEDICARE

## 2022-10-13 VITALS
RESPIRATION RATE: 12 BRPM | DIASTOLIC BLOOD PRESSURE: 80 MMHG | WEIGHT: 156.6 LBS | BODY MASS INDEX: 31.63 KG/M2 | TEMPERATURE: 97.3 F | HEART RATE: 50 BPM | OXYGEN SATURATION: 97 % | SYSTOLIC BLOOD PRESSURE: 120 MMHG

## 2022-10-13 DIAGNOSIS — J30.2 SEASONAL ALLERGIES: ICD-10-CM

## 2022-10-13 DIAGNOSIS — N18.4 CKD (CHRONIC KIDNEY DISEASE) STAGE 4, GFR 15-29 ML/MIN (HCC): ICD-10-CM

## 2022-10-13 DIAGNOSIS — F51.01 PRIMARY INSOMNIA: Primary | ICD-10-CM

## 2022-10-13 DIAGNOSIS — Z23 NEED FOR INFLUENZA VACCINATION: ICD-10-CM

## 2022-10-13 DIAGNOSIS — Z13.29 SCREENING FOR HYPOTHYROIDISM: ICD-10-CM

## 2022-10-13 DIAGNOSIS — I10 PRIMARY HYPERTENSION: ICD-10-CM

## 2022-10-13 DIAGNOSIS — Z85.3 PERSONAL HISTORY OF BREAST CANCER: ICD-10-CM

## 2022-10-13 DIAGNOSIS — E78.2 MIXED HYPERLIPIDEMIA: ICD-10-CM

## 2022-10-13 PROCEDURE — 90694 VACC AIIV4 NO PRSRV 0.5ML IM: CPT | Performed by: NURSE PRACTITIONER

## 2022-10-13 PROCEDURE — G8417 CALC BMI ABV UP PARAM F/U: HCPCS | Performed by: NURSE PRACTITIONER

## 2022-10-13 PROCEDURE — G8484 FLU IMMUNIZE NO ADMIN: HCPCS | Performed by: NURSE PRACTITIONER

## 2022-10-13 PROCEDURE — 1036F TOBACCO NON-USER: CPT | Performed by: NURSE PRACTITIONER

## 2022-10-13 PROCEDURE — 1123F ACP DISCUSS/DSCN MKR DOCD: CPT | Performed by: NURSE PRACTITIONER

## 2022-10-13 PROCEDURE — G0008 ADMIN INFLUENZA VIRUS VAC: HCPCS | Performed by: NURSE PRACTITIONER

## 2022-10-13 PROCEDURE — 99214 OFFICE O/P EST MOD 30 MIN: CPT | Performed by: NURSE PRACTITIONER

## 2022-10-13 PROCEDURE — 3017F COLORECTAL CA SCREEN DOC REV: CPT | Performed by: NURSE PRACTITIONER

## 2022-10-13 PROCEDURE — G8400 PT W/DXA NO RESULTS DOC: HCPCS | Performed by: NURSE PRACTITIONER

## 2022-10-13 PROCEDURE — G8427 DOCREV CUR MEDS BY ELIG CLIN: HCPCS | Performed by: NURSE PRACTITIONER

## 2022-10-13 PROCEDURE — 1090F PRES/ABSN URINE INCON ASSESS: CPT | Performed by: NURSE PRACTITIONER

## 2022-10-13 RX ORDER — SIMVASTATIN 20 MG
TABLET ORAL
Qty: 90 TABLET | Refills: 0 | Status: SHIPPED | OUTPATIENT
Start: 2022-10-13

## 2022-10-13 RX ORDER — MONTELUKAST SODIUM 10 MG/1
10 TABLET ORAL NIGHTLY
Qty: 90 TABLET | Refills: 0 | Status: SHIPPED | OUTPATIENT
Start: 2022-10-13

## 2022-10-13 RX ORDER — ANASTROZOLE 1 MG/1
TABLET ORAL
Qty: 30 TABLET | Status: CANCELLED | OUTPATIENT
Start: 2022-10-13

## 2022-10-13 ASSESSMENT — ENCOUNTER SYMPTOMS
SHORTNESS OF BREATH: 0
COUGH: 0
VOMITING: 0
NAUSEA: 0
DIARRHEA: 0

## 2022-10-13 NOTE — PROGRESS NOTES
Annita Escamilla  : 1950  Encounter date: 10/13/2022    This is a 66 y.o. female who presents with  Chief Complaint   Patient presents with    Follow-up Chronic Condition    Insomnia     History of present illness:    HPI   Presents to clinic today for follow up on chronic health conditions. Recently returned from [employer] with her  and son for 6 weeks. Insomnia  Since our last visit has been sleeping much better. Slept well on vacation. Has not had to take the hydroxyzine. Is hoping to get a job soon so she can stay active. HTN  Per patient well controlled. Blood pressure readings at home 130s/70s. Compliant with medications. Denies any side effects. Denies any hypotensive episodes. Denies any chest pain, heart palpitations, SOB w/exertion, leg swelling or headaches. HLD  Due for repeat labs. CKD  Follows with Nephrology. Due for repeat labs. Has next appointment in a week. Seasonal Allergies  Stable. Compliant with medications. Denies any side effects. Personal hx of breast cancer  Follows with Dr. Simi Moreno office. Follows every 6 months. No Known Allergies  Current Outpatient Medications   Medication Sig Dispense Refill    montelukast (SINGULAIR) 10 MG tablet Take 1 tablet by mouth nightly 90 tablet 0    simvastatin (ZOCOR) 20 MG tablet TAKE 1 TABLET BY MOUTH AT BEDTIME 90 tablet 0    losartan (COZAAR) 25 MG tablet Take 1 tablet by mouth in the morning. 90 tablet 1    albuterol sulfate HFA (VENTOLIN HFA) 108 (90 Base) MCG/ACT inhaler Inhale 2 puffs into the lungs every 6 hours as needed for Wheezing 18 g 1    anastrozole (ARIMIDEX) 1 MG tablet TAKE 1 TABLET BY MOUTH ONCE DAILY       No current facility-administered medications for this visit. Review of Systems   Constitutional:  Negative for activity change, appetite change, chills, fatigue and fever. Respiratory:  Negative for cough and shortness of breath.     Cardiovascular:  Negative for chest pain and palpitations. Gastrointestinal:  Negative for diarrhea, nausea and vomiting. Past medical, surgical, family and social history were reviewed and updated with the patient. Objective:    /80 (Site: Left Upper Arm, Position: Sitting, Cuff Size: Medium Adult)   Pulse 50   Temp 97.3 °F (36.3 °C) (Infrared)   Resp 12   Wt 156 lb 9.6 oz (71 kg)   SpO2 97%   BMI 31.63 kg/m²   Weight: 156 lb 9.6 oz (71 kg)     BP Readings from Last 3 Encounters:   10/13/22 120/80   07/14/22 122/80   07/06/22 127/73     Wt Readings from Last 3 Encounters:   10/13/22 156 lb 9.6 oz (71 kg)   07/20/22 153 lb (69.4 kg)   07/14/22 153 lb 3.2 oz (69.5 kg)     Physical Exam  Constitutional:       General: She is not in acute distress. Appearance: She is well-developed. HENT:      Head: Normocephalic and atraumatic. Cardiovascular:      Rate and Rhythm: Normal rate and regular rhythm. Heart sounds: Normal heart sounds, S1 normal and S2 normal.   Pulmonary:      Effort: Pulmonary effort is normal. No respiratory distress. Breath sounds: Normal breath sounds. Skin:     General: Skin is warm and dry. Neurological:      Mental Status: She is alert and oriented to person, place, and time. Psychiatric:         Thought Content: Thought content normal.         Judgment: Judgment normal.     Assessment/Plan    1. Primary insomnia  Improved. No longer needing medications. Continue to monitor. 2. CKD (chronic kidney disease) stage 4, GFR 15-29 ml/min ([facility])  Continue follow up with Nephrology. 3. Seasonal allergies  Stable. Continue on current medication regimen. - montelukast (SINGULAIR) 10 MG tablet; Take 1 tablet by mouth nightly  Dispense: 90 tablet; Refill: 0    4. Mixed hyperlipidemia  Complete routine blood work. Will adjust treatment plan if needed. - simvastatin (ZOCOR) 20 MG tablet; TAKE 1 TABLET BY MOUTH AT BEDTIME  Dispense: 90 tablet;  Refill: 0  - Comprehensive Metabolic Panel, Fasting; Future  - Lipid, Fasting; Future  - TSH with Reflex; Future    5. Primary hypertension  Stable. Continue on current medication regimen. - Comprehensive Metabolic Panel, Fasting; Future  - Lipid, Fasting; Future  - TSH with Reflex; Future    6. Personal history of breast cancer  Anastrozole refill needs to come from oncology. Continue follow up with Oncology. 7. Screening for hypothyroidism  - TSH with Reflex; Future    8. Need for influenza vaccination  - Influenza, FLUAD, (age 72 y+), IM, PF, 0.5 mL     Bolivar Fox was counseled regarding symptoms of current diagnosis, course and complications of disease if inadequately treated. Discussed side effects of medications, diagnosis, treatment options, and prognosis along with risks, benefits, complications, and alternatives of treatment including labs, imaging and other studies/treatment targets and goals. She verbalized understanding of instructions and counseling. Return in about 6 months (around 4/13/2023) for Medicare annual wellness, chronic health conditions - sooner if labs abnormal.    Medical decision making of moderate complexity.

## 2022-10-17 ENCOUNTER — HOSPITAL ENCOUNTER (OUTPATIENT)
Age: 72
Discharge: HOME | End: 2022-10-17
Payer: MEDICARE

## 2022-10-17 LAB
A/G RATIO: 1.2 (ref 1.1–2.2)
ALBUMIN SERPL-MCNC: 4.2 G/DL (ref 3.4–5)
ALP BLD-CCNC: 89 U/L (ref 40–129)
ALT SERPL-CCNC: 17 U/L (ref 10–40)
ANION GAP SERPL CALCULATED.3IONS-SCNC: 11 MMOL/L (ref 3–16)
AST SERPL-CCNC: 20 U/L (ref 15–37)
BILIRUB SERPL-MCNC: 0.4 MG/DL (ref 0–1)
BUN BLDV-MCNC: 41 MG/DL (ref 7–20)
CALCIUM SERPL-MCNC: 9.6 MG/DL (ref 8.3–10.6)
CHLORIDE BLD-SCNC: 105 MMOL/L (ref 99–110)
CHOLESTEROL, TOTAL: 157 MG/DL (ref 0–199)
CO2: 25 MMOL/L (ref 21–32)
CREAT SERPL-MCNC: 2.7 MG/DL (ref 0.6–1.2)
CREATININE URINE: 78.6 MG/DL (ref 28–259)
GFR SERPL CREATININE-BSD FRML MDRD: 18 ML/MIN/{1.73_M2}
GLUCOSE BLD-MCNC: 91 MG/DL (ref 70–99)
HCT VFR BLD CALC: 38.7 % (ref 36–48)
HDLC SERPL-MCNC: 60 MG/DL (ref 40–60)
HEMOGLOBIN: 12.8 G/DL (ref 12–16)
LDL CHOLESTEROL CALCULATED: 71 MG/DL
MAGNESIUM: 2.7 MG/DL (ref 1.8–2.4)
MCH RBC QN AUTO: 29.3 PG (ref 26–34)
MCHC RBC AUTO-ENTMCNC: 33 G/DL (ref 31–36)
MCV RBC AUTO: 88.6 FL (ref 80–100)
PARATHYROID HORMONE INTACT: 42.6 PG/ML (ref 14–72)
PDW BLD-RTO: 13.6 % (ref 12.4–15.4)
PHOSPHORUS: 4.6 MG/DL (ref 2.5–4.9)
PLATELET # BLD: 143 K/UL (ref 135–450)
PMV BLD AUTO: 9 FL (ref 5–10.5)
POTASSIUM SERPL-SCNC: 4.6 MMOL/L (ref 3.5–5.1)
PROTEIN PROTEIN: 108 MG/DL
PROTEIN/CREAT RATIO: 1.4 MG/DL
RBC # BLD: 4.37 M/UL (ref 4–5.2)
SODIUM BLD-SCNC: 141 MMOL/L (ref 136–145)
TOTAL PROTEIN: 7.6 G/DL (ref 6.4–8.2)
TRIGL SERPL-MCNC: 128 MG/DL (ref 0–150)
TSH REFLEX: 0.96 UIU/ML (ref 0.27–4.2)
VLDLC SERPL CALC-MCNC: 26 MG/DL
WBC # BLD: 7.6 K/UL (ref 4–11)

## 2022-10-17 PROCEDURE — 83970 ASSAY OF PARATHORMONE: CPT

## 2022-10-17 PROCEDURE — 36415 COLL VENOUS BLD VENIPUNCTURE: CPT

## 2022-10-17 PROCEDURE — 83735 ASSAY OF MAGNESIUM: CPT

## 2022-10-17 PROCEDURE — 84443 ASSAY THYROID STIM HORMONE: CPT

## 2022-10-17 PROCEDURE — 84156 ASSAY OF PROTEIN URINE: CPT

## 2022-10-17 PROCEDURE — 80053 COMPREHEN METABOLIC PANEL: CPT

## 2022-10-17 PROCEDURE — 82570 ASSAY OF URINE CREATININE: CPT

## 2022-10-17 PROCEDURE — 84100 ASSAY OF PHOSPHORUS: CPT

## 2022-10-17 PROCEDURE — 80061 LIPID PANEL: CPT

## 2022-10-17 PROCEDURE — 85027 COMPLETE CBC AUTOMATED: CPT

## 2022-10-25 ENCOUNTER — HOSPITAL ENCOUNTER (OUTPATIENT)
Age: 72
Discharge: HOME | End: 2022-10-25
Payer: MEDICARE

## 2022-10-25 DIAGNOSIS — N18.4 CKD (CHRONIC KIDNEY DISEASE) STAGE 4, GFR 15-29 ML/MIN (HCC): ICD-10-CM

## 2022-10-25 LAB
ANION GAP SERPL CALCULATED.3IONS-SCNC: 13 MMOL/L (ref 3–16)
BUN BLDV-MCNC: 50 MG/DL (ref 7–20)
CALCIUM SERPL-MCNC: 9.8 MG/DL (ref 8.3–10.6)
CHLORIDE BLD-SCNC: 103 MMOL/L (ref 99–110)
CO2: 24 MMOL/L (ref 21–32)
CREAT SERPL-MCNC: 3.3 MG/DL (ref 0.6–1.2)
GFR SERPL CREATININE-BSD FRML MDRD: 14 ML/MIN/{1.73_M2}
GLUCOSE BLD-MCNC: 157 MG/DL (ref 70–99)
POTASSIUM SERPL-SCNC: 4 MMOL/L (ref 3.5–5.1)
SODIUM BLD-SCNC: 140 MMOL/L (ref 136–145)

## 2022-10-25 PROCEDURE — 80048 BASIC METABOLIC PNL TOTAL CA: CPT

## 2022-10-25 PROCEDURE — 36415 COLL VENOUS BLD VENIPUNCTURE: CPT

## (undated) DEVICE — BLANKET WRM W40.2XL55.9IN IORT LO BODY + MISTRAL AIR

## (undated) DEVICE — 3M™ IOBAN™ 2 ANTIMICROBIAL INCISE DRAPE 6650EZ: Brand: IOBAN™ 2

## (undated) DEVICE — GLOVE SURG SZ 7 L12IN THK7.5MIL DK GRN LTX FREE MSG6570] MEDLINE INDUSTRIES INC]

## (undated) DEVICE — SPONGE,PEANUT,XRAY,ST,SM,3/8",5/CARD: Brand: MEDLINE INDUSTRIES, INC.

## (undated) DEVICE — Device

## (undated) DEVICE — BLADE ES ELASTOMERIC COAT INSUL DURABLE BEND UPTO 90DEG

## (undated) DEVICE — YANKAUER,BULB TIP,W/O VENT,RIGID,STERILE: Brand: MEDLINE

## (undated) DEVICE — SUTURE VCRL SZ 3-0 L18IN ABSRB UD L26MM SH 1/2 CIR J864D

## (undated) DEVICE — GAUZE,SPONGE,4"X4",8PLY,STRL,LF,10/TRAY: Brand: MEDLINE

## (undated) DEVICE — SUTURE VCRL SZ 3-0 L27IN ABSRB UD L26MM SH 1/2 CIR J416H

## (undated) DEVICE — MAJOR SET UP PK

## (undated) DEVICE — PROBE LOCALIZER W/DRAPE

## (undated) DEVICE — NEEDLE HYPO 22GA L1.5IN BLK POLYPR HUB S STL REG BVL STR

## (undated) DEVICE — PENCIL ES ULT VAC W TELSCP NOSE EZ CLN BLDE 10FT

## (undated) DEVICE — APPLIER CLP L9.38IN M LIG TI DISP STR RNG HNDL LIGACLP

## (undated) DEVICE — SUTURE MCRYL SZ 4-0 L27IN ABSRB UD L19MM PS-2 1/2 CIR PRIM Y426H

## (undated) DEVICE — CONTAINER SPEC TRANSPEC SLD COMPRESS PLATE

## (undated) DEVICE — SOLUTION IV IRRIG 500ML 0.9% SODIUM CHL 2F7123

## (undated) DEVICE — MASC TURNOVER KIT: Brand: MEDLINE INDUSTRIES, INC.

## (undated) DEVICE — ADHESIVE SKIN CLSR 0.7ML TOP DERMBND ADV

## (undated) DEVICE — GLOVE SURG SZ 6.5 L11.2IN FNGR THK9.8MIL STRW LTX POLYMER

## (undated) DEVICE — INTENDED FOR TISSUE SEPARATION, AND OTHER PROCEDURES THAT REQUIRE A SHARP SURGICAL BLADE TO PUNCTURE OR CUT.: Brand: BARD-PARKER ® STAINLESS STEEL BLADES

## (undated) DEVICE — DRAPE,CHEST,FENES,15X10,STERIL: Brand: MEDLINE

## (undated) DEVICE — TOWEL,OR,DSP,ST,BLUE,STD,4/PK,20PK/CS: Brand: MEDLINE

## (undated) DEVICE — COVER US PRB W13XL244CM SURGICAL INTRAOPERATIVE W RUBBERBAND

## (undated) DEVICE — GOWN SIRUS NONREIN XL W/TWL: Brand: MEDLINE INDUSTRIES, INC.

## (undated) DEVICE — 3M™ TEGADERM™ TRANSPARENT FILM DRESSING FRAME STYLE, 1624W, 2-3/8 IN X 2-3/4 IN (6 CM X 7 CM), 100/CT 4CT/CASE: Brand: 3M™ TEGADERM™

## (undated) DEVICE — SPECIMEN ORIENTATION CHARMS, SIX DISTINCTLY SHAPED STERILE 10MM CHARMS: Brand: MARGINMAP

## (undated) DEVICE — STAPLER SKIN H3.9MM WIRE DIA0.58MM CRWN 6.9MM 35 STPL ROT

## (undated) DEVICE — CHLORAPREP 26ML ORANGE

## (undated) DEVICE — SYRINGE MED 10ML LUERLOCK TIP W/O SFTY DISP